# Patient Record
Sex: MALE | Race: BLACK OR AFRICAN AMERICAN | NOT HISPANIC OR LATINO | Employment: UNEMPLOYED | URBAN - METROPOLITAN AREA
[De-identification: names, ages, dates, MRNs, and addresses within clinical notes are randomized per-mention and may not be internally consistent; named-entity substitution may affect disease eponyms.]

---

## 2023-02-28 ENCOUNTER — HOSPITAL ENCOUNTER (OUTPATIENT)
Dept: DATA CONVERSION | Facility: HOSPITAL | Age: 33
End: 2023-02-28

## 2023-03-30 ENCOUNTER — HOSPITAL ENCOUNTER (OUTPATIENT)
Dept: DATA CONVERSION | Facility: HOSPITAL | Age: 33
End: 2023-03-30

## 2023-12-15 ENCOUNTER — HOSPITAL ENCOUNTER (EMERGENCY)
Facility: HOSPITAL | Age: 33
Discharge: HOME | End: 2023-12-15
Payer: MEDICAID

## 2023-12-15 ENCOUNTER — ANCILLARY PROCEDURE (OUTPATIENT)
Dept: EMERGENCY MEDICINE | Facility: HOSPITAL | Age: 33
End: 2023-12-15
Payer: MEDICAID

## 2023-12-15 ENCOUNTER — HOSPITAL ENCOUNTER (EMERGENCY)
Facility: HOSPITAL | Age: 33
Discharge: HOME | End: 2023-12-15
Attending: EMERGENCY MEDICINE
Payer: MEDICAID

## 2023-12-15 VITALS
RESPIRATION RATE: 18 BRPM | HEIGHT: 66 IN | SYSTOLIC BLOOD PRESSURE: 107 MMHG | TEMPERATURE: 98.2 F | DIASTOLIC BLOOD PRESSURE: 57 MMHG | HEART RATE: 61 BPM | OXYGEN SATURATION: 100 % | BODY MASS INDEX: 24.11 KG/M2 | WEIGHT: 150 LBS

## 2023-12-15 VITALS
OXYGEN SATURATION: 100 % | SYSTOLIC BLOOD PRESSURE: 114 MMHG | HEART RATE: 72 BPM | TEMPERATURE: 97.9 F | RESPIRATION RATE: 18 BRPM | DIASTOLIC BLOOD PRESSURE: 71 MMHG

## 2023-12-15 DIAGNOSIS — R07.9 CHEST PAIN, UNSPECIFIED TYPE: Primary | ICD-10-CM

## 2023-12-15 LAB
ALBUMIN SERPL BCP-MCNC: 3.6 G/DL (ref 3.4–5)
ALP SERPL-CCNC: 52 U/L (ref 33–120)
ALT SERPL W P-5'-P-CCNC: 12 U/L (ref 10–52)
ANION GAP SERPL CALC-SCNC: 9 MMOL/L (ref 10–20)
AST SERPL W P-5'-P-CCNC: 17 U/L (ref 9–39)
BASOPHILS # BLD AUTO: 0.03 X10*3/UL (ref 0–0.1)
BASOPHILS NFR BLD AUTO: 0.4 %
BILIRUB SERPL-MCNC: 0.2 MG/DL (ref 0–1.2)
BUN SERPL-MCNC: 9 MG/DL (ref 6–23)
CALCIUM SERPL-MCNC: 8.8 MG/DL (ref 8.6–10.6)
CARDIAC TROPONIN I PNL SERPL HS: <3 NG/L (ref 0–53)
CHLORIDE SERPL-SCNC: 107 MMOL/L (ref 98–107)
CO2 SERPL-SCNC: 30 MMOL/L (ref 21–32)
CREAT SERPL-MCNC: 1.1 MG/DL (ref 0.5–1.3)
EOSINOPHIL # BLD AUTO: 0.14 X10*3/UL (ref 0–0.7)
EOSINOPHIL NFR BLD AUTO: 1.8 %
ERYTHROCYTE [DISTWIDTH] IN BLOOD BY AUTOMATED COUNT: 12.8 % (ref 11.5–14.5)
FLUAV RNA RESP QL NAA+PROBE: NOT DETECTED
FLUBV RNA RESP QL NAA+PROBE: NOT DETECTED
GFR SERPL CREATININE-BSD FRML MDRD: >90 ML/MIN/1.73M*2
GLUCOSE SERPL-MCNC: 84 MG/DL (ref 74–99)
HCT VFR BLD AUTO: 39.1 % (ref 41–52)
HGB BLD-MCNC: 13.3 G/DL (ref 13.5–17.5)
IMM GRANULOCYTES # BLD AUTO: 0.02 X10*3/UL (ref 0–0.7)
IMM GRANULOCYTES NFR BLD AUTO: 0.3 % (ref 0–0.9)
LIPASE SERPL-CCNC: 114 U/L (ref 9–82)
LYMPHOCYTES # BLD AUTO: 3.87 X10*3/UL (ref 1.2–4.8)
LYMPHOCYTES NFR BLD AUTO: 49.2 %
MAGNESIUM SERPL-MCNC: 2.02 MG/DL (ref 1.6–2.4)
MCH RBC QN AUTO: 31.9 PG (ref 26–34)
MCHC RBC AUTO-ENTMCNC: 34 G/DL (ref 32–36)
MCV RBC AUTO: 94 FL (ref 80–100)
MONOCYTES # BLD AUTO: 0.5 X10*3/UL (ref 0.1–1)
MONOCYTES NFR BLD AUTO: 6.4 %
NEUTROPHILS # BLD AUTO: 3.3 X10*3/UL (ref 1.2–7.7)
NEUTROPHILS NFR BLD AUTO: 41.9 %
NRBC BLD-RTO: 0 /100 WBCS (ref 0–0)
PLATELET # BLD AUTO: 278 X10*3/UL (ref 150–450)
POTASSIUM SERPL-SCNC: 4.4 MMOL/L (ref 3.5–5.3)
PROT SERPL-MCNC: 5.7 G/DL (ref 6.4–8.2)
RBC # BLD AUTO: 4.17 X10*6/UL (ref 4.5–5.9)
SARS-COV-2 RNA RESP QL NAA+PROBE: NOT DETECTED
SODIUM SERPL-SCNC: 142 MMOL/L (ref 136–145)
WBC # BLD AUTO: 7.9 X10*3/UL (ref 4.4–11.3)

## 2023-12-15 PROCEDURE — 87636 SARSCOV2 & INF A&B AMP PRB: CPT | Performed by: EMERGENCY MEDICINE

## 2023-12-15 PROCEDURE — 83735 ASSAY OF MAGNESIUM: CPT | Performed by: EMERGENCY MEDICINE

## 2023-12-15 PROCEDURE — 99284 EMERGENCY DEPT VISIT MOD MDM: CPT

## 2023-12-15 PROCEDURE — 85025 COMPLETE CBC W/AUTO DIFF WBC: CPT | Performed by: EMERGENCY MEDICINE

## 2023-12-15 PROCEDURE — 84484 ASSAY OF TROPONIN QUANT: CPT | Performed by: EMERGENCY MEDICINE

## 2023-12-15 PROCEDURE — 36415 COLL VENOUS BLD VENIPUNCTURE: CPT | Performed by: EMERGENCY MEDICINE

## 2023-12-15 PROCEDURE — 99281 EMR DPT VST MAYX REQ PHY/QHP: CPT

## 2023-12-15 PROCEDURE — 84075 ASSAY ALKALINE PHOSPHATASE: CPT | Performed by: EMERGENCY MEDICINE

## 2023-12-15 PROCEDURE — 83690 ASSAY OF LIPASE: CPT | Performed by: EMERGENCY MEDICINE

## 2023-12-15 PROCEDURE — 93005 ELECTROCARDIOGRAM TRACING: CPT

## 2023-12-15 PROCEDURE — 99283 EMERGENCY DEPT VISIT LOW MDM: CPT | Performed by: EMERGENCY MEDICINE

## 2023-12-15 RX ORDER — LIDOCAINE 560 MG/1
1 PATCH PERCUTANEOUS; TOPICAL; TRANSDERMAL DAILY
Status: DISCONTINUED | OUTPATIENT
Start: 2023-12-15 | End: 2023-12-15 | Stop reason: HOSPADM

## 2023-12-15 RX ORDER — LIDOCAINE 50 MG/G
1 PATCH TOPICAL DAILY
Qty: 10 PATCH | Refills: 0 | OUTPATIENT
Start: 2023-12-15 | End: 2024-02-13

## 2023-12-15 RX ORDER — ACETAMINOPHEN 325 MG/1
650 TABLET ORAL ONCE
Status: COMPLETED | OUTPATIENT
Start: 2023-12-15 | End: 2023-12-15

## 2023-12-15 RX ORDER — ACETAMINOPHEN 500 MG
1000 TABLET ORAL EVERY 6 HOURS PRN
Qty: 30 TABLET | Refills: 0 | OUTPATIENT
Start: 2023-12-15 | End: 2023-12-18

## 2023-12-15 RX ADMIN — ACETAMINOPHEN 650 MG: 325 TABLET ORAL at 05:47

## 2023-12-15 ASSESSMENT — COLUMBIA-SUICIDE SEVERITY RATING SCALE - C-SSRS
1. IN THE PAST MONTH, HAVE YOU WISHED YOU WERE DEAD OR WISHED YOU COULD GO TO SLEEP AND NOT WAKE UP?: NO
2. HAVE YOU ACTUALLY HAD ANY THOUGHTS OF KILLING YOURSELF?: NO
6. HAVE YOU EVER DONE ANYTHING, STARTED TO DO ANYTHING, OR PREPARED TO DO ANYTHING TO END YOUR LIFE?: NO

## 2023-12-15 NOTE — DISCHARGE INSTRUCTIONS
You presented for evaluation of chest pain.  You had labs including an EKG and cardiac marker called troponin which were unremarkable.  You were asymptomatic at the time of our evaluation however we treated you with a lidocaine patch and Tylenol.  Suspect your pain is musculoskeletal however we provided you with contact information for cardiology and primary care provider.  Should you have worsening symptoms please return to the emergency department.

## 2023-12-15 NOTE — ED TRIAGE NOTES
Pt to ED c/o chest pain four hours ago. Pt describes it as a left sided, non radiating, sharp, intermittent pain and rates it 7/10. Pt denies any nausea, vomiting, shortness of breath.

## 2023-12-15 NOTE — Clinical Note
Misael Rhodes was seen and treated in our emergency department on 12/15/2023.  He may return to work on 12/16/2023.       If you have any questions or concerns, please don't hesitate to call.      Didi Sam, DO

## 2023-12-15 NOTE — ED PROVIDER NOTES
HPI   Chief Complaint   Patient presents with    Chest Pain       33-year-old male without significant past medical history presenting to the ED with chest pain x 4 hours.  Patient describes it as left-sided, nonradiating, sharp and intermittent.  Pain is not associated with movement or exertion.  He denies any shortness of breath, cough, fever.  No history of DVT/PE, no lower extremity swelling.  Denies nausea, vomiting, abdominal pain.  States that he has had this intermittent pain on and off for the last few months.                          No data recorded                Patient History   No past medical history on file.  No past surgical history on file.  No family history on file.  Social History     Tobacco Use    Smoking status: Not on file    Smokeless tobacco: Not on file   Substance Use Topics    Alcohol use: Not on file    Drug use: Not on file       Physical Exam   ED Triage Vitals   Temp Pulse Resp BP   -- -- -- --      SpO2 Temp src Heart Rate Source Patient Position   -- -- -- --      BP Location FiO2 (%)     -- --       Physical Exam  Vitals and nursing note reviewed.   Constitutional:       General: He is not in acute distress.     Appearance: He is well-developed. He is not ill-appearing or toxic-appearing.   HENT:      Head: Normocephalic and atraumatic.   Eyes:      Extraocular Movements: Extraocular movements intact.      Conjunctiva/sclera: Conjunctivae normal.      Pupils: Pupils are equal, round, and reactive to light.   Neck:      Vascular: No JVD.   Cardiovascular:      Rate and Rhythm: Normal rate and regular rhythm.      Heart sounds: Normal heart sounds. No murmur heard.  Pulmonary:      Effort: Pulmonary effort is normal. No respiratory distress.      Breath sounds: Normal breath sounds. No decreased breath sounds, wheezing or rhonchi.   Abdominal:      General: There is no abdominal bruit.      Palpations: Abdomen is soft.      Tenderness: There is no abdominal tenderness. There is no  guarding or rebound.   Musculoskeletal:         General: No swelling. Normal range of motion.      Cervical back: Normal range of motion and neck supple.      Right lower leg: No tenderness. No edema.      Left lower leg: No tenderness. No edema.   Skin:     General: Skin is warm and dry.      Capillary Refill: Capillary refill takes less than 2 seconds.   Neurological:      General: No focal deficit present.      Mental Status: He is alert and oriented to person, place, and time.   Psychiatric:         Mood and Affect: Mood normal.         ED Course & MDM   ED Course as of 12/15/23 2237   Fri Dec 15, 2023   0300 ECG 12 lead  Sinus bradycardia with rate 58, normal axis.  No acute ST segment elevation or depression.  , , QTc 390. [KR]      ED Course User Index  [KR] Didi Sam DO         Diagnoses as of 12/15/23 2237   Chest pain, unspecified type       Medical Decision Making  Misael Rhodes is a 33 y.o. male who presents for atypical chest pain. Differential diagnosis includes costochondritis, MSK strain. Low suspicion for ACS (HEAR score 0), acute PE (low risk Well's and negative PERC), pericarditis, myocarditis, thoracic aortic dissection, pneumothorax, pneumonia or other acute infectious etiology.  Patient had recent imaging performed at outside hospital including a chest x-ray 12/12 showing no acute pulmonary findings.  No indication for troponin at this time given low risk HEAR score and atypical presentation. EKG was obtained, nonischemic.  Patient asymptomatic at the time of my evaluation.  Treated with Tylenol dose.  Given follow-up for primary care and cardiology. Patient was discharged home in stable condition. Patient was advised to return if symptoms worsen or don't improve. Patient was advised to follow up with their PCP as needed.           Procedure  Procedures     Didi Sam DO  Resident  12/15/23 7401

## 2023-12-16 LAB
ATRIAL RATE: 58 BPM
P AXIS: -2 DEGREES
P OFFSET: 215 MS
P ONSET: 161 MS
PR INTERVAL: 122 MS
Q ONSET: 222 MS
QRS COUNT: 9 BEATS
QRS DURATION: 102 MS
QT INTERVAL: 398 MS
QTC CALCULATION(BAZETT): 390 MS
QTC FREDERICIA: 393 MS
R AXIS: 59 DEGREES
T AXIS: 56 DEGREES
T OFFSET: 421 MS
VENTRICULAR RATE: 58 BPM

## 2023-12-18 ENCOUNTER — HOSPITAL ENCOUNTER (EMERGENCY)
Facility: HOSPITAL | Age: 33
Discharge: HOME | End: 2023-12-18
Attending: EMERGENCY MEDICINE
Payer: MEDICAID

## 2023-12-18 ENCOUNTER — HOSPITAL ENCOUNTER (EMERGENCY)
Facility: HOSPITAL | Age: 33
Discharge: LEFT BEFORE DISCHARGE | End: 2023-12-18
Payer: MEDICAID

## 2023-12-18 VITALS
HEART RATE: 91 BPM | RESPIRATION RATE: 20 BRPM | TEMPERATURE: 98.8 F | DIASTOLIC BLOOD PRESSURE: 61 MMHG | OXYGEN SATURATION: 97 % | SYSTOLIC BLOOD PRESSURE: 95 MMHG

## 2023-12-18 DIAGNOSIS — G44.209 TENSION-TYPE HEADACHE, NOT INTRACTABLE, UNSPECIFIED CHRONICITY PATTERN: Primary | ICD-10-CM

## 2023-12-18 PROCEDURE — 99284 EMERGENCY DEPT VISIT MOD MDM: CPT | Performed by: EMERGENCY MEDICINE

## 2023-12-18 PROCEDURE — 2500000004 HC RX 250 GENERAL PHARMACY W/ HCPCS (ALT 636 FOR OP/ED): Mod: SE

## 2023-12-18 PROCEDURE — 2500000001 HC RX 250 WO HCPCS SELF ADMINISTERED DRUGS (ALT 637 FOR MEDICARE OP): Mod: SE

## 2023-12-18 PROCEDURE — 96372 THER/PROPH/DIAG INJ SC/IM: CPT

## 2023-12-18 PROCEDURE — 99283 EMERGENCY DEPT VISIT LOW MDM: CPT | Mod: 25

## 2023-12-18 PROCEDURE — 4500999001 HC ED NO CHARGE

## 2023-12-18 RX ORDER — IBUPROFEN 600 MG/1
600 TABLET ORAL EVERY 6 HOURS PRN
Qty: 28 TABLET | Refills: 0 | Status: SHIPPED | OUTPATIENT
Start: 2023-12-18 | End: 2023-12-25

## 2023-12-18 RX ORDER — KETOROLAC TROMETHAMINE 30 MG/ML
30 INJECTION, SOLUTION INTRAMUSCULAR; INTRAVENOUS ONCE
Status: COMPLETED | OUTPATIENT
Start: 2023-12-18 | End: 2023-12-18

## 2023-12-18 RX ORDER — ACETAMINOPHEN 500 MG
1000 TABLET ORAL EVERY 6 HOURS PRN
Qty: 30 TABLET | Refills: 0 | Status: SHIPPED | OUTPATIENT
Start: 2023-12-18 | End: 2023-12-28

## 2023-12-18 RX ORDER — PROCHLORPERAZINE MALEATE 10 MG
10 TABLET ORAL ONCE
Status: COMPLETED | OUTPATIENT
Start: 2023-12-18 | End: 2023-12-18

## 2023-12-18 RX ORDER — ACETAMINOPHEN 325 MG/1
975 TABLET ORAL ONCE
Status: COMPLETED | OUTPATIENT
Start: 2023-12-18 | End: 2023-12-18

## 2023-12-18 RX ADMIN — ACETAMINOPHEN 975 MG: 325 TABLET ORAL at 02:51

## 2023-12-18 RX ADMIN — PROCHLORPERAZINE MALEATE 10 MG: 10 TABLET ORAL at 02:51

## 2023-12-18 RX ADMIN — KETOROLAC TROMETHAMINE 30 MG: 30 INJECTION, SOLUTION INTRAMUSCULAR; INTRAVENOUS at 02:51

## 2023-12-18 ASSESSMENT — COLUMBIA-SUICIDE SEVERITY RATING SCALE - C-SSRS
6. HAVE YOU EVER DONE ANYTHING, STARTED TO DO ANYTHING, OR PREPARED TO DO ANYTHING TO END YOUR LIFE?: YES
6. HAVE YOU EVER DONE ANYTHING, STARTED TO DO ANYTHING, OR PREPARED TO DO ANYTHING TO END YOUR LIFE?: NO
2. HAVE YOU ACTUALLY HAD ANY THOUGHTS OF KILLING YOURSELF?: NO
1. IN THE PAST MONTH, HAVE YOU WISHED YOU WERE DEAD OR WISHED YOU COULD GO TO SLEEP AND NOT WAKE UP?: NO

## 2023-12-18 NOTE — DISCHARGE INSTRUCTIONS
You presented with a headache and were treated with medications with improvement in symptoms.  Please return if you experience any worsening headache, nausea, vomiting, dizziness or progress of symptoms that do not resolve.

## 2023-12-18 NOTE — ED PROVIDER NOTES
HPI   Chief Complaint   Patient presents with    Headache     Onset approximately 5 hours ago       33-year-old male presenting to the ED with headache x 5 hours.  Patient describes pain as located all over, endorses neck tightness.  States that he was walking and the pain was gradual in onset and has just progressed.  He has not taken anything for symptoms.  He denies any nausea, vomiting, dizziness, vision changes, light sensitivity, sound sensitivity.  He denies any recent traumas or falls.  States that he does not routinely get headaches but has had similar episodes in the past.  Denies fever, chills, sick contacts.  Denies numbness or weakness.                          No data recorded                Patient History   No past medical history on file.  No past surgical history on file.  No family history on file.  Social History     Tobacco Use    Smoking status: Not on file    Smokeless tobacco: Not on file   Substance Use Topics    Alcohol use: Not on file    Drug use: Not on file       Physical Exam   ED Triage Vitals [12/18/23 0051]   Temp Heart Rate Resp BP   37.1 °C (98.8 °F) 91 20 95/61      SpO2 Temp Source Heart Rate Source Patient Position   97 % Temporal -- --      BP Location FiO2 (%)     -- --       Physical Exam  Vitals and nursing note reviewed.   Constitutional:       General: He is not in acute distress.     Appearance: He is well-developed. He is not ill-appearing.   HENT:      Head: Normocephalic and atraumatic.   Eyes:      General: No visual field deficit.     Extraocular Movements: Extraocular movements intact.      Conjunctiva/sclera: Conjunctivae normal.      Pupils: Pupils are equal, round, and reactive to light.   Cardiovascular:      Rate and Rhythm: Normal rate and regular rhythm.      Heart sounds: Normal heart sounds. No murmur heard.  Pulmonary:      Effort: Pulmonary effort is normal. No respiratory distress.      Breath sounds: Normal breath sounds.   Abdominal:      Palpations:  Abdomen is soft.      Tenderness: There is no abdominal tenderness.   Musculoskeletal:         General: No swelling. Normal range of motion.      Cervical back: Normal range of motion and neck supple. No rigidity.      Comments: Paraspinal cervical tenderness   Skin:     General: Skin is warm and dry.      Capillary Refill: Capillary refill takes less than 2 seconds.   Neurological:      Mental Status: He is alert and oriented to person, place, and time. Mental status is at baseline.      GCS: GCS eye subscore is 4. GCS verbal subscore is 5. GCS motor subscore is 6.      Cranial Nerves: No cranial nerve deficit or dysarthria.      Sensory: No sensory deficit.      Motor: No weakness.      Gait: Gait normal.   Psychiatric:         Mood and Affect: Mood normal.         ED Course & MDM   Diagnoses as of 12/18/23 0503   Tension-type headache, not intractable, unspecified chronicity pattern       Medical Decision Making  33-year-old male presenting with tension-like headache.  Afebrile, hemodynamically stable and in no acute distress.  Patient calm, alert and oriented and without focal neurologic deficits.  Pain was not sudden onset, no thunderclap intensity or neurologic deficits, low suspicion for SAH.  No recent trauma or fall to suggest intracranial bleed.  Patient ambulatory with steady gait.  Patient has reproducible cervical neck tenderness in the paraspinal musculature with reproduction of symptoms suggestive of tension headache.  No aura, nausea, vomiting, photophobia or phonophobia so lower suspicion for migraine.  Treated with Tylenol, Toradol and Compazine and on repeat evaluation was noting improvement in symptoms.  Patient provided with strict return precaution including progressive symptoms, vision changes, nausea, vomiting.  Discharged in stable condition.        Procedure  Procedures     Didi Sam DO  Resident  12/18/23 6371

## 2023-12-21 ENCOUNTER — HOSPITAL ENCOUNTER (EMERGENCY)
Facility: HOSPITAL | Age: 33
Discharge: HOME | End: 2023-12-21
Attending: EMERGENCY MEDICINE
Payer: MEDICAID

## 2023-12-21 VITALS
WEIGHT: 140 LBS | HEART RATE: 65 BPM | DIASTOLIC BLOOD PRESSURE: 69 MMHG | OXYGEN SATURATION: 100 % | RESPIRATION RATE: 17 BRPM | BODY MASS INDEX: 22.5 KG/M2 | SYSTOLIC BLOOD PRESSURE: 122 MMHG | TEMPERATURE: 98 F | HEIGHT: 66 IN

## 2023-12-21 DIAGNOSIS — Z59.819 HOUSING INSECURITY: Primary | ICD-10-CM

## 2023-12-21 PROCEDURE — 99283 EMERGENCY DEPT VISIT LOW MDM: CPT | Performed by: EMERGENCY MEDICINE

## 2023-12-21 PROCEDURE — 99284 EMERGENCY DEPT VISIT MOD MDM: CPT | Mod: 25

## 2023-12-21 SDOH — HEALTH STABILITY: MENTAL HEALTH: HAVE YOU ACTUALLY HAD ANY THOUGHTS OF KILLING YOURSELF?: NO

## 2023-12-21 SDOH — ECONOMIC STABILITY - HOUSING INSECURITY: HOUSING INSTABILITY UNSPECIFIED: Z59.819

## 2023-12-21 SDOH — HEALTH STABILITY: MENTAL HEALTH: SUICIDE ASSESSMENT: ADULT (C-SSRS)

## 2023-12-21 SDOH — HEALTH STABILITY: MENTAL HEALTH: HAVE YOU EVER DONE ANYTHING, STARTED TO DO ANYTHING, OR PREPARED TO DO ANYTHING TO END YOUR LIFE?: NO

## 2023-12-21 SDOH — HEALTH STABILITY: MENTAL HEALTH: HAVE YOU WISHED YOU WERE DEAD OR WISHED YOU COULD GO TO SLEEP AND NOT WAKE UP?: NO

## 2023-12-21 ASSESSMENT — LIFESTYLE VARIABLES
REASON UNABLE TO ASSESS: YES
HAVE YOU EVER FELT YOU SHOULD CUT DOWN ON YOUR DRINKING: NO
HAVE PEOPLE ANNOYED YOU BY CRITICIZING YOUR DRINKING: NO
EVER HAD A DRINK FIRST THING IN THE MORNING TO STEADY YOUR NERVES TO GET RID OF A HANGOVER: NO
EVER FELT BAD OR GUILTY ABOUT YOUR DRINKING: NO

## 2023-12-21 ASSESSMENT — COLUMBIA-SUICIDE SEVERITY RATING SCALE - C-SSRS
6. HAVE YOU EVER DONE ANYTHING, STARTED TO DO ANYTHING, OR PREPARED TO DO ANYTHING TO END YOUR LIFE?: NO
2. HAVE YOU ACTUALLY HAD ANY THOUGHTS OF KILLING YOURSELF?: NO
1. IN THE PAST MONTH, HAVE YOU WISHED YOU WERE DEAD OR WISHED YOU COULD GO TO SLEEP AND NOT WAKE UP?: NO

## 2023-12-21 ASSESSMENT — PAIN - FUNCTIONAL ASSESSMENT: PAIN_FUNCTIONAL_ASSESSMENT: 0-10

## 2023-12-21 ASSESSMENT — PAIN SCALES - GENERAL
PAINLEVEL_OUTOF10: 0 - NO PAIN
PAINLEVEL_OUTOF10: 0 - NO PAIN

## 2023-12-21 NOTE — ED TRIAGE NOTES
Enters ED c/o R-leg pain that began 1 hour prior. Pt also having auditory hallucinations and talking to oneself. Hostile during triage.

## 2023-12-21 NOTE — ED PROVIDER NOTES
CC: Leg Pain     HPI:  Patient is a 33-year-old male with history of schizophrenia presenting to the ED requesting housing resources.  Patient states he is living on the streets and has nowhere to sleep.  Patient does endorse a left thigh cramp which started when he was walking into the ED but has since resolved.  States he has been taking his psychiatric medications and denies any SI/HI, auditory or visual hallucinations.  Denies any trauma or other medical complaints.      Limitations to History: None    Records Reviewed:  Recent available ED and inpatient notes reviewed in EMR.    PMHx/PSHx:  Per HPI.   - has no past medical history on file.  - has no past surgical history on file.    Medications:  Reviewed in EMR. See EMR for complete list of medications and doses.    Allergies:  Patient has no known allergies.    Social History:  - Tobacco:  has no history on file for tobacco use.   - Alcohol:  has no history on file for alcohol use.   - Illicit Drugs:  has no history on file for drug use.     ROS:  Per HPI.     ???????????????????????????????????????????????????????????????  Triage Vitals:  T 36.4 °C (97.5 °F)  HR 66  /68  RR 19  O2 100 %      PHYSICAL EXAM:   VS: As documented in the triage note and EMR flowsheet from this visit were reviewed.  Gen: Well developed.  Appears comfortable.  Eyes: pupils equally round, Clear scerla.  HENT: NC/AT, Mucosal membranes moist.   Neck: Supple, tracheal midline  Resp: Non-labored breathing on RA, no stridor  CV: regular rate, extremities well perfused  Abd: Soft, non-distended, non-tender  Skin: WWP. No systemic rashes or lesions.  MSK: normal muscle bulk, no obvious joint swelling in extremities  Neuro:  AAOx3, speech fluent, MAEx4, no facial droop  Psych: Appropriate mood and affect, calm cooperative  ???????????????????????????????????????????????????????????????    ED Labs/Imaging:   Labs Reviewed - No data to display  No orders to display         ED Course  & MDM            Medical Decision Making  This is a 33-year-old male with history of schizophrenia presenting to the ED requesting housing assistance.  Patient arrives hemodynamically stable and not in acute distress.  Patient did endorse leg pain in triage but states it is a muscle cramp he gets every once in a while and on my exam is likely musculoskeletal in nature, Wells negative do not suspect trauma or DVT.  Patient otherwise is calm cooperative with appropriate mood and affect.  Patient has no SI/HI, or auditory/visual hallucinations to warrant inpatient psychiatric placement.  Patient will be discharged in the morning after social work is able to see him to discuss housing options.      Social Determinants Limiting Care:  Housing insecurity and Mental health issues    Disposition:  Discharge, pending social work assistance.     Patient seen and discussed with attending physician.    Alona Llanos MD PGY3  Emergency Medicine      Procedures ? [employer] last updated 12/21/2023 3:45 AM        Alona Llanos MD  Resident  12/21/23 3711

## 2023-12-21 NOTE — ED NOTES
Discharge paperwork gone over with pt. Education provided and prescriptions (if applicable) were given to pt. Pt IV removed and there is no bleeding at the site of removal. Pt was ambulatory out of ED independently. Pt has no further questions or concerns at this time. Treatment complete.        Blayne Eid RN  12/21/23 6845

## 2023-12-21 NOTE — PROGRESS NOTES
"Misael Rhodes is a 33 y.o. male presenting overnight to ED for lack of shelter in cold weather. SW met with patient at bedside to discuss needs. Patient reports, \"I don't get what I want from anyone, they give me what they need\". SW and patient discussed his situation; patient describes recently employment at [employer], some alcohol use, and waiting for his SSDI. Patient reports no recent criminal history or  status to assist with housing.   Patient states that he Is \"willing to work, I need a better situation\".  Patient reports homelessness and no psych meds in \"2-3 years\". SW asked patient about community SW/psych connection, recent shelter history, and about his address. Patient states his mother lives on [address] (pt's listed address); \"don't know what's she's up to\". Patient agrees with SW that he does not appear to have lived on the streets for years; patient agreeable to [facility] for stabalization.  SW to provide patient with information for [facility] (seasonal shelter), [employer] [facility] [place]'s newer program, and [facility].  SW provide patient instructions for CCD; pt to call [facility] for phone screen.  Patient is medically ready for discharge. ANNIE will schedule Uber to: [facility] [address] [place] [address].         12/21/23 0954   ED Saved Days   Number of Saved Days 3   Reason for Saved Days Inappropriate Admission Avoided  (Not appropriate psychiatrically/medically)   Level Acute   Attributed to: User (Self)       "

## 2024-01-02 ENCOUNTER — APPOINTMENT (OUTPATIENT)
Dept: PRIMARY CARE | Facility: CLINIC | Age: 34
End: 2024-01-02
Payer: MEDICAID

## 2024-01-05 ENCOUNTER — HOSPITAL ENCOUNTER (EMERGENCY)
Facility: HOSPITAL | Age: 34
Discharge: HOME | End: 2024-01-05
Payer: MEDICAID

## 2024-01-05 VITALS
HEART RATE: 87 BPM | RESPIRATION RATE: 16 BRPM | OXYGEN SATURATION: 99 % | SYSTOLIC BLOOD PRESSURE: 97 MMHG | DIASTOLIC BLOOD PRESSURE: 63 MMHG | TEMPERATURE: 95.9 F

## 2024-01-05 DIAGNOSIS — Z76.0 MEDICATION REFILL: Primary | ICD-10-CM

## 2024-01-05 PROCEDURE — 99283 EMERGENCY DEPT VISIT LOW MDM: CPT

## 2024-01-05 PROCEDURE — 2500000004 HC RX 250 GENERAL PHARMACY W/ HCPCS (ALT 636 FOR OP/ED): Mod: SE | Performed by: PHYSICIAN ASSISTANT

## 2024-01-05 PROCEDURE — 99283 EMERGENCY DEPT VISIT LOW MDM: CPT | Performed by: PHYSICIAN ASSISTANT

## 2024-01-05 RX ORDER — RISPERIDONE 1 MG/1
4 TABLET ORAL ONCE
Status: COMPLETED | OUTPATIENT
Start: 2024-01-05 | End: 2024-01-05

## 2024-01-05 RX ORDER — RISPERIDONE 4 MG/1
4 TABLET ORAL DAILY
Qty: 30 TABLET | Refills: 0 | Status: SHIPPED | OUTPATIENT
Start: 2024-01-05 | End: 2024-02-04

## 2024-01-05 RX ADMIN — RISPERIDONE 4 MG: 1 TABLET ORAL at 14:55

## 2024-01-05 NOTE — ED TRIAGE NOTES
Pt to ED for risperdal refill, unable to get appt with provider, denies physical complaints, otherwise well appearing

## 2024-01-05 NOTE — ED PROVIDER NOTES
This is a 33-year-old male with past medical history of schizophrenia who presents to the ED requesting a dose of his Risperdal as well as a refill on his prescription.  He states that he ran out of his prescription approximately 2 days ago.  He states that he has an appointment scheduled with his psychiatrist in early February.  He states he takes 4 mg daily.  He denies any SI, HI, visual or auditory hallucinations.  He states overall he is feeling well and denies any other symptoms or complaints other than wanting his Risperdal.      History provided by:  Patient  History limited by:  Psychiatric disorder   used: No             Visit Vitals  BP 97/63   Pulse 87   Temp 35.5 °C (95.9 °F)   Resp 16   SpO2 99%   Smoking Status Unknown          Physical Exam     Physical exam:   General: Vitals noted, no distress. Afebrile.   EENT:  Hearing grossly intact. Normal phonation. MMM. Airway patient. PERRL. EOMI.   Neck: No midline tenderness or paraspinal tenderness. FROM.   Cardiac: Regular, rate, rhythm. Normal S1 and S2.  No murmurs, gallops, rubs.   Pulmonary: Good air exchange. Lungs clear bilaterally. No wheezes, rhonchi, rales. No accessory muscle use.   Extremities: No peripheral edema.  Full range of motion. Moves all extremities freely. No tenderness throughout extremities.   Skin: No rash. Warm and Dry.   Neuro: No focal neurologic deficits. CN 2-12 grossly intact. Sensation equal bilaterally. No weakness.         Labs Reviewed - No data to display    No orders to display         ED Course & MDM     Medical Decision Making  This is a 33-year-old male with past medical history of schizophrenia who presents to the ED requesting a med refill for his Risperdal 4 mg daily.  Vitals did show he was mildly hypotensive at 97/63, however he denied any lightheadedness or dizziness.  On evaluation he was overall well-appearing.  He denies SI, HI, visual auditory hallucinations.  Patient did not appear to  be internally stimulated.  He was ordered 1 dose of his Risperdal here in the ED per his request and was written a prescription for this so he can get to his psychiatry department in early February.  He was given signs symptoms that he should return to the ED with.  He was agreeable to this plan.  He was discharged from emergency department in stable condition.    Risk  Prescription drug management.         Diagnoses as of 01/05/24 1450   Medication refill       Procedures    ROM Chaudhry, ARMINDA Garcia PA-C  01/05/24 1450

## 2024-01-07 ENCOUNTER — HOSPITAL ENCOUNTER (EMERGENCY)
Facility: HOSPITAL | Age: 34
Discharge: HOME | End: 2024-01-07
Attending: EMERGENCY MEDICINE
Payer: MEDICAID

## 2024-01-07 VITALS
HEIGHT: 66 IN | WEIGHT: 150 LBS | RESPIRATION RATE: 16 BRPM | BODY MASS INDEX: 24.11 KG/M2 | HEART RATE: 66 BPM | DIASTOLIC BLOOD PRESSURE: 72 MMHG | OXYGEN SATURATION: 97 % | TEMPERATURE: 98.6 F | SYSTOLIC BLOOD PRESSURE: 105 MMHG

## 2024-01-07 DIAGNOSIS — Z76.0 MEDICATION REFILL: Primary | ICD-10-CM

## 2024-01-07 PROCEDURE — 99283 EMERGENCY DEPT VISIT LOW MDM: CPT | Performed by: EMERGENCY MEDICINE

## 2024-01-07 PROCEDURE — 2500000004 HC RX 250 GENERAL PHARMACY W/ HCPCS (ALT 636 FOR OP/ED): Mod: SE | Performed by: STUDENT IN AN ORGANIZED HEALTH CARE EDUCATION/TRAINING PROGRAM

## 2024-01-07 RX ORDER — RISPERIDONE 1 MG/1
4 TABLET ORAL ONCE
Status: COMPLETED | OUTPATIENT
Start: 2024-01-07 | End: 2024-01-07

## 2024-01-07 RX ADMIN — RISPERIDONE 4 MG: 1 TABLET ORAL at 01:55

## 2024-01-07 ASSESSMENT — LIFESTYLE VARIABLES
EVER HAD A DRINK FIRST THING IN THE MORNING TO STEADY YOUR NERVES TO GET RID OF A HANGOVER: NO
HAVE YOU EVER FELT YOU SHOULD CUT DOWN ON YOUR DRINKING: NO
REASON UNABLE TO ASSESS: NO
EVER FELT BAD OR GUILTY ABOUT YOUR DRINKING: NO
HAVE PEOPLE ANNOYED YOU BY CRITICIZING YOUR DRINKING: NO

## 2024-01-07 ASSESSMENT — COLUMBIA-SUICIDE SEVERITY RATING SCALE - C-SSRS
2. HAVE YOU ACTUALLY HAD ANY THOUGHTS OF KILLING YOURSELF?: NO
6. HAVE YOU EVER DONE ANYTHING, STARTED TO DO ANYTHING, OR PREPARED TO DO ANYTHING TO END YOUR LIFE?: NO
1. IN THE PAST MONTH, HAVE YOU WISHED YOU WERE DEAD OR WISHED YOU COULD GO TO SLEEP AND NOT WAKE UP?: NO

## 2024-01-07 ASSESSMENT — PAIN SCALES - GENERAL: PAINLEVEL_OUTOF10: 0 - NO PAIN

## 2024-01-07 ASSESSMENT — PAIN - FUNCTIONAL ASSESSMENT: PAIN_FUNCTIONAL_ASSESSMENT: 0-10

## 2024-01-07 NOTE — DISCHARGE INSTRUCTIONS
Please return to the emergency department for any worsening or persistent symptoms including concern for decompensation of your schizophrenia including hallucinations, thoughts of wanting to harm yourself or others.  Please make sure that you follow-up with your psychiatry team.  You can refill your prescription at any pharmacy that is open, please bring the papers were given during your ED visit yesterday.

## 2024-01-07 NOTE — ED PROVIDER NOTES
HPI: 33-year-old male with past medical history of schizophrenia presents the emergency department with a request to have an additional dose of Risperdal.  Patient states he is on 4 mg of Risperdal daily, endorsing that he is due for his medications to come to him on Wednesday.  States that he was seen in the emergency department yesterday where he received a dose of Risperdal, per chart review he additionally received a prescription.  Patient states that he did not realize he could bring the prescription to the pharmacy tomorrow prior to his medication treatment on Wednesday.  He denies any concern for acute decompensation today.  Denies any HI, SI, AVH.  Additionally denies any chest pain, shortness of breath, fevers, chills.        ED Triage Vitals [01/07/24 0030]   Temp Heart Rate Resp BP   37 °C (98.6 °F) 74 18 110/66      SpO2 Temp Source Heart Rate Source Patient Position   97 % Temporal -- --      BP Location FiO2 (%)     -- --         Physical Examination  Vital signs reviewed in nursing triage note, on EMR flow sheets, and at bedside.  GEN: Well-developed, well nourished, in no apparent distress.   SKIN: Warm, dry, intact. No gross rashes or lesions.   EYES: Pupils equal, round. EOM grossly intact. Conjunctiva clear.   HENT: Normocephalic, atraumatic, mucous membranes moist.   NECK: Supple, trachea midline.   CV: Regular rate.   PULM: Breathing nonlabored on room air, speaks in full sentences.   GI: Abd soft, nontender, nondistended.   EXT: Symmetric muscle bulk, moves all equally.   NEURO: Alert, CN II-XII grossly intact, no gross focal deficits. Ambulates with normal gait.   PSYCH: Answers questions appropriately with congruent affect.  Denies SI, HI, AVH    MDM  33-year-old male with past medical history of schizophrenia presents the emergency department requesting dose of Risperdal.  Vital signs are stable, patient is nontoxic.  Patient was just seen in the emergency department yesterday, given a dose  of Risperdal at that time and sent home with a prescription of Risperdal until his next psychiatry appointment in February.  On presentation today, endorses that he did not realize he could bring the prescription physically to a pharmacy to have it filled.  I discussed this with him as well as gave him a dose of 4 mg of Risperdal here in the emergency department today.  He verbalized understanding, has no concerns for acute psychiatric decompensation.  He is discharged home in stable condition.    Diagnoses as of 01/07/24 0152   Medication refill          Disposition  - Discharged    Discussed with attending physician, Dr. Yaritza Crouch,   EM PGY3     Garrick Crouch DO  Resident  01/07/24 0155

## 2024-01-07 NOTE — ED TRIAGE NOTES
"Enters ED requesting \"Risperdal\" medication injection. States he has not taken medication x1 day and needs a dose. Denies SI/HI, auditory or visual hallucinations at this time.   "

## 2024-01-08 ENCOUNTER — HOSPITAL ENCOUNTER (EMERGENCY)
Facility: HOSPITAL | Age: 34
End: 2024-01-08
Payer: MEDICAID

## 2024-01-10 ENCOUNTER — HOSPITAL ENCOUNTER (EMERGENCY)
Facility: HOSPITAL | Age: 34
Discharge: HOME | End: 2024-01-10
Payer: MEDICAID

## 2024-01-10 VITALS
WEIGHT: 150 LBS | TEMPERATURE: 97.9 F | RESPIRATION RATE: 16 BRPM | DIASTOLIC BLOOD PRESSURE: 64 MMHG | SYSTOLIC BLOOD PRESSURE: 101 MMHG | OXYGEN SATURATION: 98 % | HEART RATE: 83 BPM | HEIGHT: 66 IN | BODY MASS INDEX: 24.11 KG/M2

## 2024-01-10 DIAGNOSIS — Z76.89 ENCOUNTER FOR MEDICATION ADMINISTRATION: Primary | ICD-10-CM

## 2024-01-10 PROCEDURE — 2500000004 HC RX 250 GENERAL PHARMACY W/ HCPCS (ALT 636 FOR OP/ED): Mod: SE | Performed by: PHYSICIAN ASSISTANT

## 2024-01-10 PROCEDURE — 99283 EMERGENCY DEPT VISIT LOW MDM: CPT

## 2024-01-10 PROCEDURE — 99283 EMERGENCY DEPT VISIT LOW MDM: CPT | Performed by: PHYSICIAN ASSISTANT

## 2024-01-10 RX ORDER — RISPERIDONE 1 MG/1
4 TABLET ORAL ONCE
Status: COMPLETED | OUTPATIENT
Start: 2024-01-10 | End: 2024-01-10

## 2024-01-10 RX ADMIN — RISPERIDONE 4 MG: 1 TABLET ORAL at 12:17

## 2024-01-10 ASSESSMENT — COLUMBIA-SUICIDE SEVERITY RATING SCALE - C-SSRS
6. HAVE YOU EVER DONE ANYTHING, STARTED TO DO ANYTHING, OR PREPARED TO DO ANYTHING TO END YOUR LIFE?: NO
1. IN THE PAST MONTH, HAVE YOU WISHED YOU WERE DEAD OR WISHED YOU COULD GO TO SLEEP AND NOT WAKE UP?: NO
2. HAVE YOU ACTUALLY HAD ANY THOUGHTS OF KILLING YOURSELF?: NO

## 2024-01-10 ASSESSMENT — PAIN - FUNCTIONAL ASSESSMENT: PAIN_FUNCTIONAL_ASSESSMENT: 0-10

## 2024-01-10 ASSESSMENT — PAIN SCALES - GENERAL: PAINLEVEL_OUTOF10: 0 - NO PAIN

## 2024-01-10 NOTE — ED PROVIDER NOTES
Emergency Department Encounter  [facility] EMERGENCY MEDICINE    Patient: Misael Rhodes  MRN: [id number]  : 1990  Date of Evaluation: 1/10/2024  ED Provider: Mechelle Wiseman PA-C      Chief Complaint       Chief Complaint   Patient presents with    Med Refill     HPI    Misael Rhodes is a 33 y.o. male who presents to the emergency department presenting for medication administration.  Patient has a PMH of schizophrenia, takes risperidone 4 mg once daily.  Notes that he is post to have his medication delivered to his home today, however he is concerned as it is almost noon and it has not yet been delivered.  He denies any acute decompensation.  No SI, HI, AVH.  No recent illnesses.  Patient declines needing another prescription since it will be delivered to his home, per usual.    ROS:     Review of Systems  14 systems reviewed and otherwise acutely negative except as in the HPI.    Past History   No past medical history on file.  No past surgical history on file.  Social History     Socioeconomic History    Marital status: Single     Spouse name: Not on file    Number of children: Not on file    Years of education: Not on file    Highest education level: Not on file   Occupational History    Not on file   Tobacco Use    Smoking status: Unknown    Smokeless tobacco: Not on file   Substance and Sexual Activity    Alcohol use: Not on file    Drug use: Not on file    Sexual activity: Not on file   Other Topics Concern    Not on file   Social History Narrative    Not on file     Social Determinants of Health     Financial Resource Strain: Not on file   Food Insecurity: Not on file   Transportation Needs: Not on file   Physical Activity: Not on file   Stress: Not on file   Social Connections: Not on file   Intimate Partner Violence: Not on file   Housing Stability: Not on file       Medications/Allergies     Previous Medications    LIDOCAINE (LIDODERM) 5 % PATCH    Place 1 patch over 12 hours  "on the skin once daily. Remove & discard patch within 12 hours or as directed by MD.    RISPERIDONE (RISPERDAL) 4 MG TABLET    Take 1 tablet (4 mg) by mouth once daily.     No Known Allergies     Physical Exam       ED Triage Vitals [01/10/24 1019]   Temp Heart Rate Resp BP   36.6 °C (97.9 °F) 83 16 101/64      SpO2 Temp Source Heart Rate Source Patient Position   98 % Temporal Monitor Sitting      BP Location FiO2 (%)     Right arm --         Physical Exam    Physical Exam:     VS: As documented in the triage note and EMR flowsheet from this visit were reviewed.    Appearance: Alert, oriented, cooperative, in no acute distress. Well nourished & well hydrated.    Skin: Warm, intact and dry.    Neck: Supple, without meningismus.    Pulmonary: Clear bilaterally with good chest wall excursion. No rales, rhonchi or wheezing. No accessory muscle use or stridor.     Cardiac: Normal S1, S2    Musculoskeletal: Spontaneously moving all extremities without limitation. Extremities warm and well-perfused.    Neurological:  Cranial nerves II through XII are grossly intact.    Psychiatric: Appropriate mood and affect. Kempt appearance. Does not appear internally stimulated.      Diagnostics   N/a    ED Course   Visit Vitals  /64 (BP Location: Right arm, Patient Position: Sitting)   Pulse 83   Temp 36.6 °C (97.9 °F) (Temporal)   Resp 16   Ht 1.676 m (5' 6\")   Wt 68 kg (150 lb)   SpO2 98%   BMI 24.21 kg/m²   Smoking Status Unknown   BSA 1.78 m²     Medications   risperiDONE (RisperDAL) tablet 4 mg (has no administration in time range)       Medical Decision Making     Diagnoses as of 01/10/24 1143   Encounter for medication administration     Patient presents for dose of his home medication, risperidone 4 mg.  He is administered this medication without incident.  Advise follow-up with behavioral health as needed, continue home risperidone as previously prescribed.    Final Impression      1. Encounter for medication " administration          DISPOSITION  Disposition: discharge  Patient condition is: Stable    Comment: Please note this report has been produced using speech recognition software and may contain errors related to that system including errors in grammar, punctuation, and spelling, as well as words and phrases that may be inappropriate.  If there are any questions or concerns please feel free to contact the dictating provider for clarification.    ARMINDA Acosta PA-C  01/10/24 114

## 2024-02-13 ENCOUNTER — APPOINTMENT (OUTPATIENT)
Dept: RADIOLOGY | Facility: HOSPITAL | Age: 34
End: 2024-02-13
Payer: MEDICAID

## 2024-02-13 ENCOUNTER — CLINICAL SUPPORT (OUTPATIENT)
Dept: EMERGENCY MEDICINE | Facility: HOSPITAL | Age: 34
End: 2024-02-13
Payer: MEDICAID

## 2024-02-13 ENCOUNTER — HOSPITAL ENCOUNTER (EMERGENCY)
Facility: HOSPITAL | Age: 34
Discharge: HOME | End: 2024-02-13
Attending: EMERGENCY MEDICINE
Payer: MEDICAID

## 2024-02-13 VITALS
RESPIRATION RATE: 18 BRPM | TEMPERATURE: 98.2 F | SYSTOLIC BLOOD PRESSURE: 103 MMHG | DIASTOLIC BLOOD PRESSURE: 65 MMHG | OXYGEN SATURATION: 98 % | HEART RATE: 84 BPM | BODY MASS INDEX: 26.36 KG/M2 | HEIGHT: 66 IN | WEIGHT: 164 LBS

## 2024-02-13 DIAGNOSIS — R07.9 CHEST PAIN, UNSPECIFIED TYPE: Primary | ICD-10-CM

## 2024-02-13 LAB
ALBUMIN SERPL BCP-MCNC: 4.1 G/DL (ref 3.4–5)
ALP SERPL-CCNC: 71 U/L (ref 33–120)
ALT SERPL W P-5'-P-CCNC: 25 U/L (ref 10–52)
ANION GAP SERPL CALC-SCNC: 13 MMOL/L (ref 10–20)
AST SERPL W P-5'-P-CCNC: 20 U/L (ref 9–39)
BASOPHILS # BLD MANUAL: 0 X10*3/UL (ref 0–0.1)
BASOPHILS NFR BLD MANUAL: 0 %
BILIRUB SERPL-MCNC: 0.2 MG/DL (ref 0–1.2)
BUN SERPL-MCNC: 23 MG/DL (ref 6–23)
CALCIUM SERPL-MCNC: 9.6 MG/DL (ref 8.6–10.6)
CARDIAC TROPONIN I PNL SERPL HS: <3 NG/L (ref 0–53)
CHLORIDE SERPL-SCNC: 105 MMOL/L (ref 98–107)
CO2 SERPL-SCNC: 25 MMOL/L (ref 21–32)
CREAT SERPL-MCNC: 1.07 MG/DL (ref 0.5–1.3)
EGFRCR SERPLBLD CKD-EPI 2021: >90 ML/MIN/1.73M*2
EOSINOPHIL # BLD MANUAL: 0.12 X10*3/UL (ref 0–0.7)
EOSINOPHIL NFR BLD MANUAL: 0.9 %
ERYTHROCYTE [DISTWIDTH] IN BLOOD BY AUTOMATED COUNT: 12.9 % (ref 11.5–14.5)
GLUCOSE SERPL-MCNC: 98 MG/DL (ref 74–99)
HCT VFR BLD AUTO: 41.2 % (ref 41–52)
HGB BLD-MCNC: 14.2 G/DL (ref 13.5–17.5)
IMM GRANULOCYTES # BLD AUTO: 0.07 X10*3/UL (ref 0–0.7)
IMM GRANULOCYTES NFR BLD AUTO: 0.5 % (ref 0–0.9)
LYMPHOCYTES # BLD MANUAL: 4.89 X10*3/UL (ref 1.2–4.8)
LYMPHOCYTES NFR BLD MANUAL: 37.9 %
MCH RBC QN AUTO: 30.7 PG (ref 26–34)
MCHC RBC AUTO-ENTMCNC: 34.5 G/DL (ref 32–36)
MCV RBC AUTO: 89 FL (ref 80–100)
MONOCYTES # BLD MANUAL: 0.55 X10*3/UL (ref 0.1–1)
MONOCYTES NFR BLD MANUAL: 4.3 %
MYELOCYTES # BLD MANUAL: 0.22 X10*3/UL
MYELOCYTES NFR BLD MANUAL: 1.7 %
NEUTS SEG # BLD MANUAL: 7.12 X10*3/UL (ref 1.2–7)
NEUTS SEG NFR BLD MANUAL: 55.2 %
NRBC BLD-RTO: 0 /100 WBCS (ref 0–0)
PLATELET # BLD AUTO: 246 X10*3/UL (ref 150–450)
POTASSIUM SERPL-SCNC: 4.3 MMOL/L (ref 3.5–5.3)
PROT SERPL-MCNC: 6.7 G/DL (ref 6.4–8.2)
RBC # BLD AUTO: 4.62 X10*6/UL (ref 4.5–5.9)
RBC MORPH BLD: ABNORMAL
SODIUM SERPL-SCNC: 139 MMOL/L (ref 136–145)
TOTAL CELLS COUNTED BLD: 116
WBC # BLD AUTO: 12.9 X10*3/UL (ref 4.4–11.3)

## 2024-02-13 PROCEDURE — 85027 COMPLETE CBC AUTOMATED: CPT

## 2024-02-13 PROCEDURE — 85007 BL SMEAR W/DIFF WBC COUNT: CPT

## 2024-02-13 PROCEDURE — 36415 COLL VENOUS BLD VENIPUNCTURE: CPT

## 2024-02-13 PROCEDURE — 84484 ASSAY OF TROPONIN QUANT: CPT

## 2024-02-13 PROCEDURE — 71046 X-RAY EXAM CHEST 2 VIEWS: CPT | Performed by: RADIOLOGY

## 2024-02-13 PROCEDURE — 99284 EMERGENCY DEPT VISIT MOD MDM: CPT | Mod: 25 | Performed by: EMERGENCY MEDICINE

## 2024-02-13 PROCEDURE — 99285 EMERGENCY DEPT VISIT HI MDM: CPT

## 2024-02-13 PROCEDURE — 93010 ELECTROCARDIOGRAM REPORT: CPT

## 2024-02-13 PROCEDURE — 71046 X-RAY EXAM CHEST 2 VIEWS: CPT

## 2024-02-13 PROCEDURE — 80053 COMPREHEN METABOLIC PANEL: CPT

## 2024-02-13 PROCEDURE — 93005 ELECTROCARDIOGRAM TRACING: CPT

## 2024-02-13 RX ORDER — ACETAMINOPHEN 500 MG
1000 TABLET ORAL EVERY 6 HOURS PRN
Qty: 30 TABLET | Refills: 0 | Status: SHIPPED | OUTPATIENT
Start: 2024-02-13 | End: 2024-02-23

## 2024-02-13 RX ORDER — IBUPROFEN 600 MG/1
600 TABLET ORAL EVERY 6 HOURS PRN
Qty: 28 TABLET | Refills: 0 | OUTPATIENT
Start: 2024-02-13 | End: 2024-02-18

## 2024-02-13 RX ORDER — LIDOCAINE 50 MG/G
1 PATCH TOPICAL DAILY
Qty: 6 PATCH | Refills: 0 | Status: SHIPPED | OUTPATIENT
Start: 2024-02-13

## 2024-02-13 ASSESSMENT — LIFESTYLE VARIABLES
EVER FELT BAD OR GUILTY ABOUT YOUR DRINKING: NO
EVER HAD A DRINK FIRST THING IN THE MORNING TO STEADY YOUR NERVES TO GET RID OF A HANGOVER: NO
HAVE YOU EVER FELT YOU SHOULD CUT DOWN ON YOUR DRINKING: NO
HAVE PEOPLE ANNOYED YOU BY CRITICIZING YOUR DRINKING: NO

## 2024-02-13 ASSESSMENT — PAIN DESCRIPTION - DESCRIPTORS: DESCRIPTORS: STABBING

## 2024-02-14 LAB
ATRIAL RATE: 92 BPM
P AXIS: 74 DEGREES
P OFFSET: 202 MS
P ONSET: 156 MS
PR INTERVAL: 130 MS
Q ONSET: 221 MS
QRS COUNT: 15 BEATS
QRS DURATION: 82 MS
QT INTERVAL: 332 MS
QTC CALCULATION(BAZETT): 410 MS
QTC FREDERICIA: 382 MS
R AXIS: 59 DEGREES
T AXIS: 57 DEGREES
T OFFSET: 387 MS
VENTRICULAR RATE: 92 BPM

## 2024-02-14 NOTE — ED PROVIDER NOTES
HPI   Chief Complaint   Patient presents with    Chest Pain   This is an otherwise healthy 34-year-old male who presents to the ED with chest pain.  Patient states that he was lying in bed approximately 3 hours ago when he began to experience intermittent stabbing pains on the left side of his chest, rated 7/10, with no radiation. No falls or trauma. No recent travel. No surgical history. Denies any sick contacts. Patient does endorse that he has been lifting weights recently.   Denies any fevers, chills, dizziness, cough, shortness of breath, abdominal pain, N/V/D, leg pain or swelling.    Patient History   No past medical history on file.  No past surgical history on file.  No family history on file.  Social History     Tobacco Use    Smoking status: Unknown    Smokeless tobacco: Not on file   Substance Use Topics    Alcohol use: Not on file    Drug use: Not on file       Physical Exam   ED Triage Vitals [02/13/24 2010]   Temperature Heart Rate Respirations BP   36.9 °C (98.4 °F) 96 18 121/79      Pulse Ox Temp Source Heart Rate Source Patient Position   100 % Temporal -- --      BP Location FiO2 (%)     -- 21 %       Physical Exam  Constitutional:       General: He is not in acute distress.     Appearance: He is not ill-appearing or toxic-appearing.   Cardiovascular:      Rate and Rhythm: Normal rate and regular rhythm.      Heart sounds: Normal heart sounds.   Pulmonary:      Effort: Pulmonary effort is normal. No respiratory distress.      Breath sounds: No wheezing, rhonchi or rales.   Chest:      Chest wall: Tenderness present.   Abdominal:      General: Bowel sounds are normal.      Palpations: Abdomen is soft.      Tenderness: There is no abdominal tenderness.   Skin:     General: Skin is warm.      Coloration: Skin is not pale.   Neurological:      Mental Status: He is alert and oriented to person, place, and time.      Gait: Gait normal.       ED Course & MDM   ED Course as of 02/13/24 2337   Tue Feb 13,  2024 2251 XR chest 2 views  My independent interpretation shows no acute cardiopulmonary process [LH]      ED Course User Index  [LH] Jamila Montgomery PA-C         Diagnoses as of 02/13/24 2337   Chest pain, unspecified type       Medical Decision Making  This is an otherwise healthy 34-year-old male who presents to the ED with chest pain.  Patient declined any medication for pain while in the ED.   ACS is unlikely given normal EKG and normal troponin. Patient has a negative PERC score, PE is unlikely. Radiology did not show any pneumonia or pneumothorax. Lab work did not show any significant anemia, leukocytosis or electrolyte disturbances.   Given lack of concerning ED workup, patient's recent exercise and that the pain is reproducible on chest palpation, symptoms are most likely musculoskeletal. Patient was given prescriptions for Tylenol, Motrin and lidocaine patches for pain. He was provided the contact information for [facility] to establish care with a PCP for follow up. Return criteria discussed. Patient verbalized understanding and is agreeable with plan of care. Discharged in stable condition.        Jamila Montgomery PA-C  02/13/24 8701

## 2024-02-14 NOTE — ED TRIAGE NOTES
"L sided chest pain starting 3 hours ago. Pt denies radiation of pain. Denies n/v. Pain described as \"stabbing\".   "

## 2024-02-17 ENCOUNTER — HOSPITAL ENCOUNTER (EMERGENCY)
Facility: HOSPITAL | Age: 34
Discharge: LEFT BEFORE DISCHARGE | End: 2024-02-17
Payer: MEDICAID

## 2024-02-17 PROCEDURE — 4500999001 HC ED NO CHARGE

## 2024-02-18 ENCOUNTER — CLINICAL SUPPORT (OUTPATIENT)
Dept: EMERGENCY MEDICINE | Facility: HOSPITAL | Age: 34
End: 2024-02-18
Payer: MEDICAID

## 2024-02-18 ENCOUNTER — HOSPITAL ENCOUNTER (EMERGENCY)
Facility: HOSPITAL | Age: 34
Discharge: HOME | End: 2024-02-18
Payer: MEDICAID

## 2024-02-18 ENCOUNTER — HOSPITAL ENCOUNTER (EMERGENCY)
Facility: HOSPITAL | Age: 34
Discharge: HOME | End: 2024-02-18
Attending: EMERGENCY MEDICINE
Payer: MEDICAID

## 2024-02-18 VITALS
BODY MASS INDEX: 25.71 KG/M2 | OXYGEN SATURATION: 99 % | SYSTOLIC BLOOD PRESSURE: 123 MMHG | TEMPERATURE: 98.6 F | WEIGHT: 160 LBS | HEART RATE: 65 BPM | DIASTOLIC BLOOD PRESSURE: 73 MMHG | HEIGHT: 66 IN | RESPIRATION RATE: 18 BRPM

## 2024-02-18 VITALS
OXYGEN SATURATION: 100 % | HEART RATE: 77 BPM | TEMPERATURE: 98.3 F | WEIGHT: 160 LBS | BODY MASS INDEX: 25.71 KG/M2 | DIASTOLIC BLOOD PRESSURE: 62 MMHG | SYSTOLIC BLOOD PRESSURE: 115 MMHG | RESPIRATION RATE: 18 BRPM | HEIGHT: 66 IN

## 2024-02-18 DIAGNOSIS — Z59.819 HOUSING INSECURITY: Primary | ICD-10-CM

## 2024-02-18 DIAGNOSIS — Z59.00 HOMELESSNESS: ICD-10-CM

## 2024-02-18 DIAGNOSIS — R51.9 ACUTE NONINTRACTABLE HEADACHE, UNSPECIFIED HEADACHE TYPE: Primary | ICD-10-CM

## 2024-02-18 DIAGNOSIS — R07.9 CHEST PAIN, UNSPECIFIED TYPE: ICD-10-CM

## 2024-02-18 LAB
ALBUMIN SERPL BCP-MCNC: 3.5 G/DL (ref 3.4–5)
ALP SERPL-CCNC: 61 U/L (ref 33–120)
ALT SERPL W P-5'-P-CCNC: 21 U/L (ref 10–52)
ANION GAP SERPL CALC-SCNC: 11 MMOL/L (ref 10–20)
AST SERPL W P-5'-P-CCNC: 22 U/L (ref 9–39)
ATRIAL RATE: 61 BPM
BASOPHILS # BLD AUTO: 0.02 X10*3/UL (ref 0–0.1)
BASOPHILS NFR BLD AUTO: 0.2 %
BILIRUB SERPL-MCNC: 0.2 MG/DL (ref 0–1.2)
BUN SERPL-MCNC: 20 MG/DL (ref 6–23)
CALCIUM SERPL-MCNC: 9.3 MG/DL (ref 8.6–10.6)
CHLORIDE SERPL-SCNC: 108 MMOL/L (ref 98–107)
CO2 SERPL-SCNC: 27 MMOL/L (ref 21–32)
CREAT SERPL-MCNC: 1.06 MG/DL (ref 0.5–1.3)
EGFRCR SERPLBLD CKD-EPI 2021: >90 ML/MIN/1.73M*2
EOSINOPHIL # BLD AUTO: 0.17 X10*3/UL (ref 0–0.7)
EOSINOPHIL NFR BLD AUTO: 2 %
ERYTHROCYTE [DISTWIDTH] IN BLOOD BY AUTOMATED COUNT: 13 % (ref 11.5–14.5)
GLUCOSE SERPL-MCNC: 99 MG/DL (ref 74–99)
HCT VFR BLD AUTO: 35 % (ref 41–52)
HGB BLD-MCNC: 11.9 G/DL (ref 13.5–17.5)
IMM GRANULOCYTES # BLD AUTO: 0.05 X10*3/UL (ref 0–0.7)
IMM GRANULOCYTES NFR BLD AUTO: 0.6 % (ref 0–0.9)
LYMPHOCYTES # BLD AUTO: 3.01 X10*3/UL (ref 1.2–4.8)
LYMPHOCYTES NFR BLD AUTO: 35.2 %
MCH RBC QN AUTO: 30.9 PG (ref 26–34)
MCHC RBC AUTO-ENTMCNC: 34 G/DL (ref 32–36)
MCV RBC AUTO: 91 FL (ref 80–100)
MONOCYTES # BLD AUTO: 0.93 X10*3/UL (ref 0.1–1)
MONOCYTES NFR BLD AUTO: 10.9 %
NEUTROPHILS # BLD AUTO: 4.38 X10*3/UL (ref 1.2–7.7)
NEUTROPHILS NFR BLD AUTO: 51.1 %
NRBC BLD-RTO: 0 /100 WBCS (ref 0–0)
P AXIS: 63 DEGREES
P OFFSET: 205 MS
P ONSET: 154 MS
PLATELET # BLD AUTO: 238 X10*3/UL (ref 150–450)
POTASSIUM SERPL-SCNC: 4.2 MMOL/L (ref 3.5–5.3)
PR INTERVAL: 136 MS
PROT SERPL-MCNC: 5.6 G/DL (ref 6.4–8.2)
Q ONSET: 222 MS
QRS COUNT: 10 BEATS
QRS DURATION: 86 MS
QT INTERVAL: 398 MS
QTC CALCULATION(BAZETT): 400 MS
QTC FREDERICIA: 399 MS
R AXIS: 88 DEGREES
RBC # BLD AUTO: 3.85 X10*6/UL (ref 4.5–5.9)
SODIUM SERPL-SCNC: 142 MMOL/L (ref 136–145)
T AXIS: 66 DEGREES
T OFFSET: 421 MS
VENTRICULAR RATE: 61 BPM
WBC # BLD AUTO: 8.6 X10*3/UL (ref 4.4–11.3)

## 2024-02-18 PROCEDURE — 99283 EMERGENCY DEPT VISIT LOW MDM: CPT

## 2024-02-18 PROCEDURE — 2500000001 HC RX 250 WO HCPCS SELF ADMINISTERED DRUGS (ALT 637 FOR MEDICARE OP): Mod: SE

## 2024-02-18 PROCEDURE — 85025 COMPLETE CBC W/AUTO DIFF WBC: CPT | Performed by: EMERGENCY MEDICINE

## 2024-02-18 PROCEDURE — 99283 EMERGENCY DEPT VISIT LOW MDM: CPT | Mod: 25

## 2024-02-18 PROCEDURE — 99284 EMERGENCY DEPT VISIT MOD MDM: CPT | Performed by: EMERGENCY MEDICINE

## 2024-02-18 PROCEDURE — 80053 COMPREHEN METABOLIC PANEL: CPT | Performed by: EMERGENCY MEDICINE

## 2024-02-18 PROCEDURE — 36415 COLL VENOUS BLD VENIPUNCTURE: CPT

## 2024-02-18 PROCEDURE — 93005 ELECTROCARDIOGRAM TRACING: CPT

## 2024-02-18 RX ORDER — IBUPROFEN 600 MG/1
600 TABLET ORAL EVERY 8 HOURS PRN
Qty: 30 TABLET | Refills: 0 | Status: SHIPPED | OUTPATIENT
Start: 2024-02-18

## 2024-02-18 RX ORDER — IBUPROFEN 600 MG/1
600 TABLET ORAL ONCE
Status: COMPLETED | OUTPATIENT
Start: 2024-02-18 | End: 2024-02-18

## 2024-02-18 RX ORDER — PROCHLORPERAZINE MALEATE 10 MG
5 TABLET ORAL ONCE
Status: COMPLETED | OUTPATIENT
Start: 2024-02-18 | End: 2024-02-18

## 2024-02-18 RX ADMIN — IBUPROFEN 600 MG: 600 TABLET, FILM COATED ORAL at 20:03

## 2024-02-18 RX ADMIN — PROCHLORPERAZINE MALEATE 5 MG: 10 TABLET ORAL at 20:03

## 2024-02-18 SDOH — ECONOMIC STABILITY - HOUSING INSECURITY: HOMELESSNESS UNSPECIFIED: Z59.00

## 2024-02-18 SDOH — ECONOMIC STABILITY - HOUSING INSECURITY: HOUSING INSTABILITY UNSPECIFIED: Z59.819

## 2024-02-18 ASSESSMENT — PAIN - FUNCTIONAL ASSESSMENT
PAIN_FUNCTIONAL_ASSESSMENT: 0-10
PAIN_FUNCTIONAL_ASSESSMENT: 0-10

## 2024-02-18 ASSESSMENT — COLUMBIA-SUICIDE SEVERITY RATING SCALE - C-SSRS
2. HAVE YOU ACTUALLY HAD ANY THOUGHTS OF KILLING YOURSELF?: NO
6. HAVE YOU EVER DONE ANYTHING, STARTED TO DO ANYTHING, OR PREPARED TO DO ANYTHING TO END YOUR LIFE?: NO
1. IN THE PAST MONTH, HAVE YOU WISHED YOU WERE DEAD OR WISHED YOU COULD GO TO SLEEP AND NOT WAKE UP?: YES
6. HAVE YOU EVER DONE ANYTHING, STARTED TO DO ANYTHING, OR PREPARED TO DO ANYTHING TO END YOUR LIFE?: NO
1. IN THE PAST MONTH, HAVE YOU WISHED YOU WERE DEAD OR WISHED YOU COULD GO TO SLEEP AND NOT WAKE UP?: YES
2. HAVE YOU ACTUALLY HAD ANY THOUGHTS OF KILLING YOURSELF?: NO

## 2024-02-18 ASSESSMENT — PAIN SCALES - GENERAL
PAINLEVEL_OUTOF10: 7
PAINLEVEL_OUTOF10: 4
PAINLEVEL_OUTOF10: 0 - NO PAIN
PAINLEVEL_OUTOF10: 0 - NO PAIN

## 2024-02-18 ASSESSMENT — LIFESTYLE VARIABLES
HAVE YOU EVER FELT YOU SHOULD CUT DOWN ON YOUR DRINKING: NO
EVER HAD A DRINK FIRST THING IN THE MORNING TO STEADY YOUR NERVES TO GET RID OF A HANGOVER: NO
HAVE PEOPLE ANNOYED YOU BY CRITICIZING YOUR DRINKING: NO
EVER FELT BAD OR GUILTY ABOUT YOUR DRINKING: NO

## 2024-02-18 ASSESSMENT — PAIN DESCRIPTION - LOCATION
LOCATION: HEAD
LOCATION: HEAD

## 2024-02-18 ASSESSMENT — PAIN DESCRIPTION - PAIN TYPE
TYPE: ACUTE PAIN
TYPE: ACUTE PAIN

## 2024-02-18 NOTE — ED TRIAGE NOTES
Pt was sleeping in the back of the lobby he was woke up by [facility] [employer] and asked to leave pt stated that he wanted to check in for a 4/10 headache that he has had for the last 4 hours. Pt also states that he has a hx/of schizophrenia and for the last 3 days he has been having thoughts of wanting to harm himself, he denies having any plan at this time

## 2024-02-18 NOTE — DISCHARGE INSTRUCTIONS
Please continue following with your psychiatrist and primary care provider.  We have provided you with information on coordinated intake and local shelters.

## 2024-02-18 NOTE — ED PROVIDER NOTES
HPI   Chief Complaint   Patient presents with    Headache    Suicidal       Patient is a 34-year-old male with past medical history of schizophrenia on risperidone presenting with chief complaint of needing a job and a place to stay.  Patient sleeping comfortably in triage until he was asked to leave and then endorsed concern of headache.  Patient does endorse headache that started about that time that is mild and not associated with any vision changes, weakness, sensory loss.  Does not endorse any falls or head strike.  Did not endorse wanting any medications for his headache.  Patient reports he is currently homeless.  Does endorse vague thoughts of hurting himself but does not endorse ever having a plan and does not endorse currently having those thoughts.  Denies thoughts of hurting anyone else and denies auditory or visual hallucinations.  Does not endorse alcohol or other drug use.                          [place] Coma Scale Score: 15                     Patient History   History reviewed. No pertinent past medical history.  History reviewed. No pertinent surgical history.  No family history on file.  Social History     Tobacco Use    Smoking status: Unknown    Smokeless tobacco: Not on file   Substance Use Topics    Alcohol use: Not on file    Drug use: Not on file       Physical Exam   ED Triage Vitals [02/18/24 0103]   Temperature Heart Rate Respirations BP   36.8 °C (98.3 °F) 75 16 109/53      Pulse Ox Temp Source Heart Rate Source Patient Position   98 % Temporal Monitor Sitting      BP Location FiO2 (%)     Right arm 21 %       Physical Exam  Vitals and nursing note reviewed.   Constitutional:       General: He is not in acute distress.     Appearance: He is well-developed.   HENT:      Head: Normocephalic and atraumatic.   Eyes:      Conjunctiva/sclera: Conjunctivae normal.   Cardiovascular:      Rate and Rhythm: Normal rate and regular rhythm.      Heart sounds: No murmur heard.  Pulmonary:       Effort: Pulmonary effort is normal. No respiratory distress.      Breath sounds: Normal breath sounds.   Abdominal:      Palpations: Abdomen is soft.      Tenderness: There is no abdominal tenderness.   Musculoskeletal:         General: No swelling.      Cervical back: Neck supple.   Skin:     General: Skin is warm and dry.      Capillary Refill: Capillary refill takes less than 2 seconds.   Neurological:      Mental Status: He is alert.   Psychiatric:         Mood and Affect: Mood normal.         ED Course & MDM   Diagnoses as of 02/19/24 0102   Housing insecurity       Medical Decision Making  Patient is a 34-year-old male with past medical history of schizophrenia on risperidone presenting with chief complaint of needing a job and a place to stay.  Patient with no evidence of decompensated schizophrenia, calm and cooperative on exam and no indication for psychiatric care or repeat referral at this time.  Patient did endorse headache to triage but reports headache is improving and declined medication for this at this time.  No associated neurological symptoms.  Patient endorses primary concern is his homelessness.  Social work not available at this hour.  Patient provided handouts on mental health resources, coordinated intake, shelters in the area.  Patient observed in ED with no evidence of psychosis or return of headache.  Return precautions discussed with patient and patient discharged.    Patient seen and discussed with Dr. Rosanna Aguilar MD, PhD  Emergency Medicine PGY2          Procedure  Procedures     Jeff Aguilar MD  Resident  02/19/24 0100

## 2024-02-19 ENCOUNTER — DOCUMENTATION (OUTPATIENT)
Dept: SOCIAL WORK | Age: 34
End: 2024-02-19

## 2024-02-19 ENCOUNTER — HOSPITAL ENCOUNTER (EMERGENCY)
Facility: HOSPITAL | Age: 34
Discharge: HOME | End: 2024-02-19
Payer: MEDICAID

## 2024-02-19 VITALS
WEIGHT: 160 LBS | DIASTOLIC BLOOD PRESSURE: 84 MMHG | OXYGEN SATURATION: 99 % | RESPIRATION RATE: 18 BRPM | TEMPERATURE: 98 F | HEART RATE: 71 BPM | SYSTOLIC BLOOD PRESSURE: 138 MMHG | BODY MASS INDEX: 25.71 KG/M2 | HEIGHT: 66 IN

## 2024-02-19 PROCEDURE — 4500999001 HC ED NO CHARGE

## 2024-02-19 SDOH — HEALTH STABILITY: MENTAL HEALTH: NON-SPECIFIC ACTIVE SUICIDAL THOUGHTS (PAST 1 MONTH): YES

## 2024-02-19 SDOH — HEALTH STABILITY: MENTAL HEALTH: DEPRESSION SYMPTOMS: NO PROBLEMS REPORTED OR OBSERVED.

## 2024-02-19 SDOH — HEALTH STABILITY: MENTAL HEALTH: ANXIETY SYMPTOMS: NO PROBLEMS REPORTED OR OBSERVED.

## 2024-02-19 SDOH — HEALTH STABILITY: MENTAL HEALTH: SUICIDAL BEHAVIOR (3 MONTHS): NO

## 2024-02-19 SDOH — HEALTH STABILITY: MENTAL HEALTH: WHEN DID YOU TRY TO KILL YOURSELF?: 2016

## 2024-02-19 SDOH — HEALTH STABILITY: MENTAL HEALTH: SUICIDAL BEHAVIOR (LIFETIME): YES

## 2024-02-19 SDOH — ECONOMIC STABILITY: HOUSING INSECURITY

## 2024-02-19 SDOH — HEALTH STABILITY: MENTAL HEALTH: IN THE PAST FEW WEEKS, HAVE YOU FELT THAT YOU OR YOUR FAMILY WOULD BE BETTER OFF IF YOU WERE DEAD?: NO

## 2024-02-19 SDOH — HEALTH STABILITY: MENTAL HEALTH: IN THE PAST WEEK, HAVE YOU BEEN HAVING THOUGHTS ABOUT KILLING YOURSELF?: YES

## 2024-02-19 SDOH — HEALTH STABILITY: MENTAL HEALTH: HAVE YOU EVER TRIED TO KILL YOURSELF?: YES

## 2024-02-19 SDOH — HEALTH STABILITY: MENTAL HEALTH: WISH TO BE DEAD (PAST 1 MONTH): NO

## 2024-02-19 SDOH — HEALTH STABILITY: MENTAL HEALTH: ACTIVE SUICIDAL IDEATION WITH SPECIFIC PLAN AND INTENT (PAST 1 MONTH): NO

## 2024-02-19 SDOH — HEALTH STABILITY: MENTAL HEALTH: ARE YOU HAVING THOUGHTS OF KILLING YOURSELF RIGHT NOW?: NO

## 2024-02-19 SDOH — HEALTH STABILITY: MENTAL HEALTH: ACTIVE SUICIDAL IDEATION WITH SOME INTENT TO ACT, WITHOUT SPECIFIC PLAN (PAST 1 MONTH): NO

## 2024-02-19 SDOH — HEALTH STABILITY: MENTAL HEALTH: IN THE PAST FEW WEEKS, HAVE YOU WISHED YOU WERE DEAD?: NO

## 2024-02-19 SDOH — HEALTH STABILITY: MENTAL HEALTH: HOW DID YOU TRY TO KILL YOURSELF?: OD ON PILLS

## 2024-02-19 ASSESSMENT — COLUMBIA-SUICIDE SEVERITY RATING SCALE - C-SSRS
6. HAVE YOU EVER DONE ANYTHING, STARTED TO DO ANYTHING, OR PREPARED TO DO ANYTHING TO END YOUR LIFE?: NO
1. IN THE PAST MONTH, HAVE YOU WISHED YOU WERE DEAD OR WISHED YOU COULD GO TO SLEEP AND NOT WAKE UP?: NO
2. HAVE YOU ACTUALLY HAD ANY THOUGHTS OF KILLING YOURSELF?: NO
5. HAVE YOU STARTED TO WORK OUT OR WORKED OUT THE DETAILS OF HOW TO KILL YOURSELF? DO YOU INTEND TO CARRY OUT THIS PLAN?: NO
6. HAVE YOU EVER DONE ANYTHING, STARTED TO DO ANYTHING, OR PREPARED TO DO ANYTHING TO END YOUR LIFE?: NO
4. HAVE YOU HAD THESE THOUGHTS AND HAD SOME INTENTION OF ACTING ON THEM?: NO

## 2024-02-19 ASSESSMENT — LIFESTYLE VARIABLES
PRESCIPTION_ABUSE_PAST_12_MONTHS: NO
SUBSTANCE_ABUSE_PAST_12_MONTHS: NO

## 2024-02-19 ASSESSMENT — PAIN SCALES - GENERAL: PAINLEVEL_OUTOF10: 0 - NO PAIN

## 2024-02-19 ASSESSMENT — PAIN - FUNCTIONAL ASSESSMENT: PAIN_FUNCTIONAL_ASSESSMENT: 0-10

## 2024-02-19 NOTE — ED PROVIDER NOTES
HPI   Chief Complaint   Patient presents with    Headache       Limitations to History: None    HPI: This is a 34-year-old male with a past medical history of schizophrenia, homelessness who presents to the emergency room with a headache.  Of note, patient was seen here last night for the same complaint.  He is also endorsing suicidal ideations to me.  He does not have a plan.  He is homeless and is stating that he needs a job and a place to sleep.  He denies any other medical complaints such as fevers, chills, cough, nasal congestion, chest pain, shortness of breath, nausea, vomiting, abdominal pain, or change in bowel habits.  Patient denies any worst headache of his life or thunderclap headaches.  Patient denies any neck stiffness.    Additional History Obtained from: None    12 point review of systems was performed and is negative unless otherwise specified    ------------------------------------------------------------------------------------------------------------------------------------------  Physical Exam:    VS: As documented in the triage note and EMR flowsheet from this visit were reviewed.    CONSTITUTIONAL: Well appearing, well nourished, awake, alert, oriented to person, place, time/situation and in no apparent distress.   EYES: Clear bilaterally, pupils equal, round and reactive to light.   CARDIOVASCULAR: Normal rate, regular rhythm.  Heart sounds S1, S2.  No murmurs, rubs or gallops.  RESPIRATORY: Breath sounds clear and equal bilaterally. No wheezes or rhonchi.   GASTROINTESTINAL: Abdomen soft, non-distended, no rebound, no guarding.   MUSCULOSKELETAL: Spine appears normal, range of motion is not limited, no muscle or joint tenderness.   NEUROLOGICAL: Alert and oriented, no focal deficits, no motor or sensory deficits.  Cranial nerves II through XII intact bilaterally.  No cerebellar ataxia.  Patient is able to ambulate effectively.  SKIN: Skin normal color for race, warm, dry and intact. No  evidence of trauma.   PSYCHIATRIC: Alert and oriented to person, place, time/situation. normal mood and affect. No apparent risk to self or others.     ------------------------------------------------------------------------------------------------------------------------------------------    Medical Decision Making:    This is a 34-year-old male who presents to the emergency room requesting a place to stay with headaches and SI.  Patient's neurological exam is benign and lowers my concern for meningitis, cavernous venous thrombosis, posterior stroke, or subarachnoid hemorrhage.  Patient remained stable throughout course of care.  I do have high clinical suspicion that this patient is presenting with secondary gain given his extensive history and frequent emergency room utilization.  EPAT was consulted and patient was placed on elopement and suicide precautions.  Patient was administered ibuprofen and Compazine for his headache.  EPAT evaluated patient and determined that patient be safely discharged home.  Patient will be discharged home with a prescription for ibuprofen.  Patient was given strict return precautions.  Patient was agreeable to this plan and had no further questions.  I did provide patient with the number for the [facility] for outpatient follow-up.  Patient will be discharged home.  Patient understands that if symptoms worsen or change in any way, they should return for immediate medical attention.  Patient understands that they should follow-up with their primary care physician within the next week to follow-up for headache, homelessness.  Patient was provided a street card.  Patient was stable upon discharge.      External Records Reviewed: I reviewed recent and relevant outside records including: Previous provider notes      Escalation of Care:  Appropriate for outpatient follow-up with primary care provider    Social Determinants Affecting Care:  Schizophrenia,  homelessness    Prescription Drug Consideration: Ibuprofen    Discussion of Management with Other Providers:   I discussed the patient/results with: NESS East PA-C  [facility]  [facility]                            [place] Coma Scale Score: 15                     Patient History   No past medical history on file.  No past surgical history on file.  No family history on file.  Social History     Tobacco Use    Smoking status: Unknown    Smokeless tobacco: Not on file   Substance Use Topics    Alcohol use: Not on file    Drug use: Not on file       Physical Exam   ED Triage Vitals [02/18/24 1903]   Temperature Heart Rate Respirations BP   37.2 °C (99 °F) 74 18 111/70      Pulse Ox Temp Source Heart Rate Source Patient Position   99 % Temporal Monitor Sitting      BP Location FiO2 (%)     Right arm --       Physical Exam    ED Course & MDM        Medical Decision Making      Procedure  Procedures     Jayson De Jesus PA-C  02/18/24 2012

## 2024-02-19 NOTE — PROGRESS NOTES
"EPAT - Social Work Psychiatric Assessment    Arrival Details  Mode of Arrival: Ambulatory  Admission Source: Home  Admission Type: Voluntary  EPAT Assessment Start Date: 02/19/24  EPAT Assessment Start Time: 2030  Name of : LATOYA Chávez LSW    History of Present Illness  Admission Reason: suicidal ideation  HPI: Patient is a 34 year old  male, with a history of schizophrenia, presenting to the ED for suicidal ideation and headache. ED provider note, nursing notes, [place] suicide risk scale and community records reviewed, patient reportedly states he is having vague suicidal ideation, currently homeless and needs a job and a place to sleep. He denies homicidal ideation, visual/auditory hallucinations or delusional thinking. Triage indicates low risk. Patient was seen by this ED this morning for passive SI and headache. Provider put the chief complaint as housing insecurity and discharged him with community resources. Per chart review, patient has several other ED visits in the past three months for SI/HI/psych eval mainly for secondary gain, and several other visits for homelessness.    SW Readmission Information   Readmission within 30 Days: Yes  Previous ED Visit Date and Reason : 2/18 headache and SI  [place] [facility] [place]  Factors Contributing to  Readmission Inpatient/ED (Team Perspective): Homeless  Readmission Factors Team Comments: homelessness  Factors Contributing to Readmission (Patient/Family Perspective): \"needs a place to sleep\"    Psychiatric Symptoms  Anxiety Symptoms: No problems reported or observed.  Depression Symptoms: No problems reported or observed.  Tiana Symptoms: No problems reported or observed.    Psychosis Symptoms  Hallucination Type: No problems reported or observed.  Delusion Type: No problems reported or observed.    Additional Symptoms - Adult  Generalized Anxiety Disorder: Difficult to control worry, Excessive " anxiety/worry  Obsessive Compulsive Disorder: No problems reported or observed.  Panic Attack: No problems reported or observed.  Post Traumatic Stress Disorder: No problems reported or observed.  Delirium: No problems reported or observed.    Past Psychiatric History/Meds/Treatments  Past Psychiatric History: hx of schizophrenia // prior admission in 2016 at [facility] // hx of SA via pill ingestion in 2016 // denies family hx  Past Psychiatric Meds/Treatments: risperdal  Past Violence/Victimization History: none    Current Mental Health Contacts   Name/Phone Number: [facility]   Last Appointment Date: tomorrow  Provider Name/Phone Number: [facility]  Provider Last Appointment Date: unknown    Support System: Community    Living Arrangement: Homeless    Home Safety  Feels Safe Living in Home:  (homeless)    Income Information  Employment Status for: Patient  Employment Status: Disabled  Income Source: Disability    [employer] Service/Education History  Current or Previous  Service: None  Education Level:  (did not assess)  History of Learning Problems:  (did not assess)    Social/Cultural History  Social History: [place] citizen, pt is his own guardian  Cultural Requests During Hospitalization: none  Spiritual Requests During Hospitalization: none  Important Activities: Social    Legal  Legal Considerations: Patient/ Family Ability to Make Healthcare Decisions  Assistance with Managing/Advocating Healthcare Needs:  (self)  Criminal Activity/ Legal Involvement Pertinent to Current Situation/ Hospitalization: none    Drug Screening  Have you used any substances (canabis, cocaine, heroin, hallucinogens, inhalants, etc.) in the past 12 months?: No  Have you used any prescription drugs other than prescribed in the past 12 months?: No  Is a toxicology screen needed?: Yes    Stage of Change  Stage of Change: Action  History of Treatment: Sober living  Type of Treatment Offered:  (thrive)  Treatment Offered:  Declined  Duration of Substance Use: reports only using thc however was residing in a recovery center until three days ago  Frequency of Substance Use: unknown  Age of First Substance Use: unknown    Psychosocial  Psychosocial (WDL): Within Defined Limits  Behaviors/Mood: Cooperative    Orientation  Orientation Level: Oriented X4    General Appearance  Motor Activity: Unremarkable  Speech Pattern:  (regular rate and tone)  General Attitude: Cooperative  Appearance/Hygiene: Unremarkable    Thought Process  Coherency:  (linear)  Content: Unremarkable  Delusions:  (none)  Perception: Not altered  Hallucination: None  Judgment/Insight: Poor  Confusion: None  Cognition: Appropriate safety awareness    Sleep Pattern  Sleep Pattern: Disturbed/interrupted sleep    Risk Factors  Self Harm/Suicidal Ideation Plan: vague SI  Previous Self Harm/Suicidal Plans: none  Risk Factors: Male, Lower socioeconomic status    Violence Risk Assessment  Assessment of Violence: None noted  Thoughts of Harm to Others: No    Ability to Assess Risk Screen  Risk Screen - Ability to Assess: Able to be screened  Ask Suicide-Screening Questions  1. In the past few weeks, have you wished you were dead?: No  2. In the past few weeks, have you felt that you or your family would be better off if you were dead?: No  3. In the past week, have you been having thoughts about killing yourself?: Yes  4. Have you ever tried to kill yourself?: Yes  How did you try to kill yourself?: OD on pills  When did you try to kill yourself?: 2016  5. Are you having thoughts of killing yourself right now?: No  Calculated Risk Score: Potential Risk  [place] Suicide Severity Rating Scale (Screener/Recent Self-Report)  1. Wish to be Dead (Past 1 Month): No  2. Non-Specific Active Suicidal Thoughts (Past 1 Month): Yes  3. Active Suicidal Ideation with any Methods (Not Plan) Without Intent to Act (Past 1 Month): No  4. Active Suicidal Ideation with Some Intent to Act, Without  Specific Plan (Past 1 Month): No  5. Active Suicidal Ideation with Specific Plan and Intent (Past 1 Month): No  6. Suicidal Behavior (Lifetime): Yes  6. Suicidal Behavior (3 Months): No  Calculated C-SSRS Risk Score (Lifetime/Recent): Moderate Risk  Step 1: Risk Factors  Current & Past Psychiatric Dx: Psychotic disorder, Mood disorder  Presenting Symptoms: Anxiety and/or panic  Precipitants/Stressors: Triggering events leading to humiliation, shame, and/or despair (e.g. loss of relationship, financial or health status) (real or anticipated), Pending incarceration or homelessness  Change in Treatment:  (none)  Access to Lethal Methods : No  Step 2: Protective Factors   Protective Factors Internal: Ability to cope with stress, Frustration tolerance, Identifies reasons for living  Protective Factors External: Cultural, spiritual and/or moral attitudes against suicide  Step 3: Suicidal Ideation Intensity  Most Severe Suicidal Ideation Identified: vague SI  How Many Times Have You Had These Thoughts: Less than once a week  When You Have the Thoughts How Long do They Last : Fleeting - few seconds or minutes  Could/Can You Stop Thinking About Killing Yourself or Wanting to Die if You Want to: Easily able to control thoughts  Are There Things - Anyone or Anything - That Stopped You From Wanting to Die or Acting on: Deterrents definitely stopped you from attempting suicide  What Sort of Reasons Did You Have For Thinking About Wanting to Die or Killing Yourself: Completely to get attention, revenge, or a reaction from others  Total Score: 5  Step 5: Documentation  Risk Level: Low suicide risk    Patient is a 34 year old  male, with a history of schizophrenia, presenting to the ED for suicidal ideation and headache. Prior to assessment, patient is calm and cooperative in the ED. Upon assessment, he presents as anxious with affect congruent to mood. Patient endorses difficulty controlling worries, excessive  worries, and helplessness. He denies homicidal ideation, visual/auditory hallucinations or delusional thinking. Patient reports he is currently homeless and endorses suicidal thoughts in the past three days, no plans, previous attempt in 2016 via med ingestion. When being asked what happened three days ago, he states he was kicked out by the recovery center he was living at, “because I had to go to my clinical appointment, so they put me out”, unable to clarify further. Patient reports he has been walking around and napping here and there for the past two days, “I need somewhere to get some sleep”. He states he is connected with [facility] for psychiatry and case management, currently taking Risperdal. He has an upcoming appointment with  tomorrow to discuss housing options. Patient requests for “to get some sleep and food” while in the ED. He does not seem internally stimulated or under acute distress. Patient is able to identify reasons for living and community connection.     Patient does not meet criteria for inpatient admission as he is not posing an immediate risk of harm to himself and others, or being gravely disabled by his mental health. He has a case management appointment tomorrow with [facility]. He is safe to be discharged at this time, Dr. De Jesus in agreement.      Psychiatric Impression and Plan of Care  Assessment and Plan: see above  Specific Resources Provided to Patient: pt is linked with outpatient provider  CM Notified: none  PHP/IOP Recommended: none  Specific Information Provided for PHP/IOP: none    Outcome/Disposition  Patient's Perception of Outcome Achieved: patient agrees  Assessment, Recommendations and Risk Level Reviewed with: Dr. De Jesus  Contact Name: -  Contact Number(s): -  Contact Relationship: -  EPAT Assessment Completed Date: 02/18/24  EPAT Assessment Completed Time: 2058

## 2024-02-20 ENCOUNTER — HOSPITAL ENCOUNTER (EMERGENCY)
Facility: HOSPITAL | Age: 34
Discharge: HOME | End: 2024-02-20
Attending: EMERGENCY MEDICINE
Payer: MEDICAID

## 2024-02-20 VITALS
HEART RATE: 61 BPM | SYSTOLIC BLOOD PRESSURE: 102 MMHG | OXYGEN SATURATION: 98 % | DIASTOLIC BLOOD PRESSURE: 67 MMHG | TEMPERATURE: 98.2 F | RESPIRATION RATE: 16 BRPM

## 2024-02-20 DIAGNOSIS — F20.9 SCHIZOPHRENIA, UNSPECIFIED TYPE (MULTI): Primary | ICD-10-CM

## 2024-02-20 PROCEDURE — 99284 EMERGENCY DEPT VISIT MOD MDM: CPT | Performed by: EMERGENCY MEDICINE

## 2024-02-20 PROCEDURE — 99283 EMERGENCY DEPT VISIT LOW MDM: CPT

## 2024-02-20 PROCEDURE — 2500000002 HC RX 250 W HCPCS SELF ADMINISTERED DRUGS (ALT 637 FOR MEDICARE OP, ALT 636 FOR OP/ED): Mod: SE

## 2024-02-20 RX ORDER — RISPERIDONE 1 MG/1
4 TABLET ORAL ONCE
Status: COMPLETED | OUTPATIENT
Start: 2024-02-20 | End: 2024-02-20

## 2024-02-20 RX ADMIN — RISPERIDONE 4 MG: 1 TABLET, FILM COATED ORAL at 05:12

## 2024-02-20 ASSESSMENT — PAIN SCALES - GENERAL: PAINLEVEL_OUTOF10: 0 - NO PAIN

## 2024-02-20 ASSESSMENT — COLUMBIA-SUICIDE SEVERITY RATING SCALE - C-SSRS
6. HAVE YOU EVER DONE ANYTHING, STARTED TO DO ANYTHING, OR PREPARED TO DO ANYTHING TO END YOUR LIFE?: NO
5. HAVE YOU STARTED TO WORK OUT OR WORKED OUT THE DETAILS OF HOW TO KILL YOURSELF? DO YOU INTEND TO CARRY OUT THIS PLAN?: NO
4. HAVE YOU HAD THESE THOUGHTS AND HAD SOME INTENTION OF ACTING ON THEM?: NO
1. IN THE PAST MONTH, HAVE YOU WISHED YOU WERE DEAD OR WISHED YOU COULD GO TO SLEEP AND NOT WAKE UP?: YES
2. HAVE YOU ACTUALLY HAD ANY THOUGHTS OF KILLING YOURSELF?: NO
6. HAVE YOU EVER DONE ANYTHING, STARTED TO DO ANYTHING, OR PREPARED TO DO ANYTHING TO END YOUR LIFE?: NO

## 2024-02-20 NOTE — ED NOTES
Pt denies plan; states last attempt was 4 years and he tried to overdose on psych pills     Kalli Vale RN  02/20/24 0045

## 2024-02-20 NOTE — ED PROVIDER NOTES
"HPI   Chief Complaint   Patient presents with    Psychiatric Evaluation     \"I need my medicine\"       The patient is a 34-year-old male with past medical history of schizophrenia previous on Invega who presents today for medication request.  Patient states that he would like to have a dose of risperidone and take a nap here to rest.  He denies any homicidal ideation, but states that he does have passive suicidal ideation without a plan.  He states that when he does not take his risperidone, which he has been out of for 3 days, he develops passive suicidal ideation. He reports that once he gets his risperidone, he feels better and will no longer be suicidal. He repeatedly denies any plan to hurt himself. He denies any homicidal ideation.  He denies any auditory or visual hallucinations or delusions.  Record review does demonstrate that he has a psychiatry appointment scheduled at 830 today. No trauma, falls, or injuries.  No passing out. No headache, dizziness, vision changes, neck pain, back pain, cough, chest pain, shortness of breath, nausea, vomiting, abdominal pain, diarrhea, urinary symptoms, numbness, tingling, fevers, or chills.       History provided by:  Patient  History limited by:  Psychiatric disorder   used: No                        [place] Coma Scale Score: 15                     Patient History   No past medical history on file.  No past surgical history on file.  No family history on file.  Social History     Tobacco Use    Smoking status: Unknown    Smokeless tobacco: Not on file   Substance Use Topics    Alcohol use: Not on file    Drug use: Not on file       Physical Exam   ED Triage Vitals   Temperature Heart Rate Respirations BP   02/20/24 0045 02/20/24 0045 02/20/24 0045 02/20/24 0045   37.2 °C (98.9 °F) 66 20 105/57      Pulse Ox Temp Source Heart Rate Source Patient Position   02/20/24 0045 02/20/24 0045 02/20/24 0434 02/20/24 0434   96 % Temporal Monitor Lying      BP " Location FiO2 (%)     02/20/24 0434 --     Right arm        Physical Exam  Vitals and nursing note reviewed.   Constitutional:       Appearance: Normal appearance. He is not ill-appearing, toxic-appearing or diaphoretic.   HENT:      Head: Normocephalic and atraumatic.      Nose: Nose normal.      Mouth/Throat:      Mouth: Mucous membranes are moist.      Pharynx: Oropharynx is clear. No oropharyngeal exudate or posterior oropharyngeal erythema.   Eyes:      General: No scleral icterus.     Extraocular Movements: Extraocular movements intact.      Pupils: Pupils are equal, round, and reactive to light.   Cardiovascular:      Rate and Rhythm: Normal rate and regular rhythm.      Pulses: Normal pulses.      Heart sounds: Normal heart sounds. No murmur heard.     No gallop.   Pulmonary:      Breath sounds: Normal breath sounds. No wheezing, rhonchi or rales.   Chest:      Chest wall: No tenderness.   Abdominal:      General: Abdomen is flat.      Palpations: Abdomen is soft.      Tenderness: There is no abdominal tenderness. There is no guarding or rebound.      Hernia: No hernia is present.   Musculoskeletal:         General: No swelling, deformity or signs of injury. Normal range of motion.      Cervical back: Normal range of motion and neck supple. No rigidity.      Right lower leg: No edema.      Left lower leg: No edema.   Lymphadenopathy:      Cervical: No cervical adenopathy.   Skin:     General: Skin is warm and dry.      Capillary Refill: Capillary refill takes less than 2 seconds.      Findings: No erythema or rash.   Neurological:      General: No focal deficit present.      Mental Status: He is alert and oriented to person, place, and time.   Psychiatric:      Comments: Flat, restricted affect but is calm and cooperative. Does not appear internally stimulated.          ED Course & MDM   Diagnoses as of 02/22/24 0847   Schizophrenia, unspecified type (CMS/[facility])       Medical Decision Making  34-year-old male  with past medical history of schizophrenia presenting today for medication request.  Patient was previously evaluated by GALDINO in the ED yesterday who felt that he was appropriate for discharge with outpatient resources. EPAT did not feel the patient met criteria for inpatient psychiatric admission. He does have a scheduled appointment with his psychiatrist at 830, and given this, we will provide him with a dose of his risperidone here. Patient denies any active suicidal ideation or homicidal ideation. He denies any plan to hurt himself. He reports that once he gets his risperdal, he will feel better. Patient was allowed time to rest and provided food and drink. Upon repeat evaluation, he reports feeling better and feels comfortable being discharged. He denies any pain. He continues to deny any active suicidal or homicidal ideation. He denies having any plan to hurt himself. He does not appear internally stimulated. He reports that he plans to follow up with his psychiatrist at his scheduled appointment today at 8:30 AM. He feels comfortable with this plan.  Given the fact the patient has previously been cleared by EPAT yesterday, had previous EPAT labs with no active suicidal ideation, I do not believe the patient currently requires any labs or any additional workup at this time.  I did strongly recommend he follow-up with his psychiatrist this morning. Patient expressed understanding. The patient felt comfortable being discharged home. The patient was instructed of supportive measures and to follow-up with his psychiatrist and primary care physician. Strict precautions were provided, for which the patient expressed understanding. The patient was discharged home in stable condition. They should feel free to return to the Emergency Department at any time should their condition worsen or should they have any questions or concerns.         Procedure  Procedures     Chinedu Choi MD  Resident  02/22/24 0826    I saw  and evaluated the patient. I personally obtained the key and critical portions of the history and physical exam or was physically present for key and critical portions performed by the resident/fellow. I reviewed the resident/fellow's documentation and discussed the patient with the resident/fellow. I agree with the resident/fellow's medical decision making as documented in the note.    MD Sofie Silver MD  02/22/24 1010

## 2024-02-20 NOTE — DISCHARGE INSTRUCTIONS
You were seen today because you felt suicidal because of not taking her risperidone.  We gave you a dose of risperidone.  You were evaluated by EPAT yesterday and they felt that you are safe to go home.  Given appointment with a psychiatrist at 830 today, you should follow-up with a psychiatrist for possible long-acting medication.  If you begin to have thoughts of hurting yourself with the plan, worsening of hallucinations or delusions, you should return here to the emergency department.

## 2024-02-22 ENCOUNTER — HOSPITAL ENCOUNTER (EMERGENCY)
Facility: HOSPITAL | Age: 34
Discharge: HOME | End: 2024-02-23
Attending: EMERGENCY MEDICINE
Payer: MEDICAID

## 2024-02-22 ENCOUNTER — CLINICAL SUPPORT (OUTPATIENT)
Dept: EMERGENCY MEDICINE | Facility: HOSPITAL | Age: 34
End: 2024-02-22
Payer: MEDICAID

## 2024-02-22 DIAGNOSIS — Z00.8 EVALUATION BY PSYCHIATRIC SERVICE REQUIRED: Primary | ICD-10-CM

## 2024-02-22 LAB
ALBUMIN SERPL BCP-MCNC: 4 G/DL (ref 3.4–5)
ALP SERPL-CCNC: 71 U/L (ref 33–120)
ALT SERPL W P-5'-P-CCNC: 23 U/L (ref 10–52)
AMPHETAMINES UR QL SCN: ABNORMAL
ANION GAP SERPL CALC-SCNC: 11 MMOL/L (ref 10–20)
APAP SERPL-MCNC: <10 UG/ML
APPEARANCE UR: CLEAR
AST SERPL W P-5'-P-CCNC: 17 U/L (ref 9–39)
ATRIAL RATE: 57 BPM
BARBITURATES UR QL SCN: ABNORMAL
BASOPHILS # BLD AUTO: 0.02 X10*3/UL (ref 0–0.1)
BASOPHILS NFR BLD AUTO: 0.3 %
BENZODIAZ UR QL SCN: ABNORMAL
BILIRUB SERPL-MCNC: 0.2 MG/DL (ref 0–1.2)
BILIRUB UR STRIP.AUTO-MCNC: NEGATIVE MG/DL
BUN SERPL-MCNC: 10 MG/DL (ref 6–23)
BZE UR QL SCN: ABNORMAL
CALCIUM SERPL-MCNC: 9.2 MG/DL (ref 8.6–10.6)
CANNABINOIDS UR QL SCN: ABNORMAL
CHLORIDE SERPL-SCNC: 105 MMOL/L (ref 98–107)
CO2 SERPL-SCNC: 27 MMOL/L (ref 21–32)
COLOR UR: NORMAL
CREAT SERPL-MCNC: 1.14 MG/DL (ref 0.5–1.3)
EGFRCR SERPLBLD CKD-EPI 2021: 87 ML/MIN/1.73M*2
EOSINOPHIL # BLD AUTO: 0.14 X10*3/UL (ref 0–0.7)
EOSINOPHIL NFR BLD AUTO: 2 %
ERYTHROCYTE [DISTWIDTH] IN BLOOD BY AUTOMATED COUNT: 12.8 % (ref 11.5–14.5)
ETHANOL SERPL-MCNC: <10 MG/DL
FENTANYL+NORFENTANYL UR QL SCN: ABNORMAL
FLUAV RNA RESP QL NAA+PROBE: NOT DETECTED
FLUBV RNA RESP QL NAA+PROBE: NOT DETECTED
GLUCOSE SERPL-MCNC: 77 MG/DL (ref 74–99)
GLUCOSE UR STRIP.AUTO-MCNC: NORMAL MG/DL
HCT VFR BLD AUTO: 38.7 % (ref 41–52)
HGB BLD-MCNC: 13.3 G/DL (ref 13.5–17.5)
IMM GRANULOCYTES # BLD AUTO: 0.03 X10*3/UL (ref 0–0.7)
IMM GRANULOCYTES NFR BLD AUTO: 0.4 % (ref 0–0.9)
KETONES UR STRIP.AUTO-MCNC: NEGATIVE MG/DL
LEUKOCYTE ESTERASE UR QL STRIP.AUTO: NEGATIVE
LYMPHOCYTES # BLD AUTO: 2.93 X10*3/UL (ref 1.2–4.8)
LYMPHOCYTES NFR BLD AUTO: 41.2 %
MCH RBC QN AUTO: 31.1 PG (ref 26–34)
MCHC RBC AUTO-ENTMCNC: 34.4 G/DL (ref 32–36)
MCV RBC AUTO: 90 FL (ref 80–100)
MONOCYTES # BLD AUTO: 0.59 X10*3/UL (ref 0.1–1)
MONOCYTES NFR BLD AUTO: 8.3 %
NEUTROPHILS # BLD AUTO: 3.41 X10*3/UL (ref 1.2–7.7)
NEUTROPHILS NFR BLD AUTO: 47.8 %
NITRITE UR QL STRIP.AUTO: NEGATIVE
NRBC BLD-RTO: 0 /100 WBCS (ref 0–0)
OPIATES UR QL SCN: ABNORMAL
OXYCODONE+OXYMORPHONE UR QL SCN: ABNORMAL
P AXIS: 47 DEGREES
P OFFSET: 201 MS
P ONSET: 148 MS
PCP UR QL SCN: ABNORMAL
PH UR STRIP.AUTO: 6.5 [PH]
PLATELET # BLD AUTO: 322 X10*3/UL (ref 150–450)
POTASSIUM SERPL-SCNC: 4 MMOL/L (ref 3.5–5.3)
PR INTERVAL: 146 MS
PROT SERPL-MCNC: 6.6 G/DL (ref 6.4–8.2)
PROT UR STRIP.AUTO-MCNC: NEGATIVE MG/DL
Q ONSET: 221 MS
QRS COUNT: 9 BEATS
QRS DURATION: 82 MS
QT INTERVAL: 388 MS
QTC CALCULATION(BAZETT): 377 MS
QTC FREDERICIA: 381 MS
R AXIS: 92 DEGREES
RBC # BLD AUTO: 4.28 X10*6/UL (ref 4.5–5.9)
RBC # UR STRIP.AUTO: NEGATIVE /UL
SALICYLATES SERPL-MCNC: <3 MG/DL
SARS-COV-2 RNA RESP QL NAA+PROBE: NOT DETECTED
SODIUM SERPL-SCNC: 139 MMOL/L (ref 136–145)
SP GR UR STRIP.AUTO: 1.01
T AXIS: 47 DEGREES
T OFFSET: 415 MS
UROBILINOGEN UR STRIP.AUTO-MCNC: NORMAL MG/DL
VENTRICULAR RATE: 57 BPM
WBC # BLD AUTO: 7.1 X10*3/UL (ref 4.4–11.3)

## 2024-02-22 PROCEDURE — 80307 DRUG TEST PRSMV CHEM ANLYZR: CPT | Performed by: EMERGENCY MEDICINE

## 2024-02-22 PROCEDURE — 93010 ELECTROCARDIOGRAM REPORT: CPT

## 2024-02-22 PROCEDURE — 87636 SARSCOV2 & INF A&B AMP PRB: CPT | Performed by: EMERGENCY MEDICINE

## 2024-02-22 PROCEDURE — 93005 ELECTROCARDIOGRAM TRACING: CPT

## 2024-02-22 PROCEDURE — 36415 COLL VENOUS BLD VENIPUNCTURE: CPT | Performed by: EMERGENCY MEDICINE

## 2024-02-22 PROCEDURE — 85025 COMPLETE CBC W/AUTO DIFF WBC: CPT | Performed by: EMERGENCY MEDICINE

## 2024-02-22 PROCEDURE — 99285 EMERGENCY DEPT VISIT HI MDM: CPT | Mod: 25

## 2024-02-22 PROCEDURE — 80143 DRUG ASSAY ACETAMINOPHEN: CPT | Performed by: EMERGENCY MEDICINE

## 2024-02-22 PROCEDURE — 81003 URINALYSIS AUTO W/O SCOPE: CPT | Mod: 59 | Performed by: EMERGENCY MEDICINE

## 2024-02-22 PROCEDURE — 80053 COMPREHEN METABOLIC PANEL: CPT | Performed by: EMERGENCY MEDICINE

## 2024-02-22 PROCEDURE — 99285 EMERGENCY DEPT VISIT HI MDM: CPT | Performed by: EMERGENCY MEDICINE

## 2024-02-22 RX ORDER — ACETAMINOPHEN 325 MG/1
975 TABLET ORAL ONCE
Status: DISCONTINUED | OUTPATIENT
Start: 2024-02-22 | End: 2024-02-23 | Stop reason: HOSPADM

## 2024-02-22 RX ORDER — KETOROLAC TROMETHAMINE 30 MG/ML
30 INJECTION, SOLUTION INTRAMUSCULAR; INTRAVENOUS ONCE
Status: DISCONTINUED | OUTPATIENT
Start: 2024-02-22 | End: 2024-02-23 | Stop reason: HOSPADM

## 2024-02-22 SDOH — HEALTH STABILITY: MENTAL HEALTH: IN THE PAST FEW WEEKS, HAVE YOU WISHED YOU WERE DEAD?: NO

## 2024-02-22 SDOH — SOCIAL STABILITY: SOCIAL INSECURITY: FAMILY BEHAVIORS: UNABLE TO ASSESS

## 2024-02-22 SDOH — HEALTH STABILITY: MENTAL HEALTH: ACTIVE SUICIDAL IDEATION WITH SOME INTENT TO ACT, WITHOUT SPECIFIC PLAN (PAST 1 MONTH): YES

## 2024-02-22 SDOH — SOCIAL STABILITY: SOCIAL NETWORK: VISITOR BEHAVIORS: UNABLE TO ASSESS

## 2024-02-22 SDOH — HEALTH STABILITY: MENTAL HEALTH: SUICIDE ASSESSMENT: ADULT (C-SSRS)

## 2024-02-22 SDOH — SOCIAL STABILITY: SOCIAL NETWORK: EMOTIONAL SUPPORT GIVEN: REASSURE

## 2024-02-22 SDOH — HEALTH STABILITY: MENTAL HEALTH: HAVE YOU HAD THESE THOUGHTS AND HAD SOME INTENTION OF ACTING ON THEM?: NO

## 2024-02-22 SDOH — HEALTH STABILITY: MENTAL HEALTH: WISH TO BE DEAD (PAST 1 MONTH): NO

## 2024-02-22 SDOH — HEALTH STABILITY: MENTAL HEALTH: CONTENT: UNREMARKABLE

## 2024-02-22 SDOH — HEALTH STABILITY: MENTAL HEALTH: WHEN DID YOU TRY TO KILL YOURSELF?: 2016

## 2024-02-22 SDOH — HEALTH STABILITY: MENTAL HEALTH

## 2024-02-22 SDOH — HEALTH STABILITY: MENTAL HEALTH: HAVE YOU BEEN THINKING ABOUT HOW YOU MIGHT DO THIS?: NO

## 2024-02-22 SDOH — HEALTH STABILITY: MENTAL HEALTH: DEPRESSION SYMPTOMS: NO PROBLEMS REPORTED OR OBSERVED.

## 2024-02-22 SDOH — HEALTH STABILITY: MENTAL HEALTH: HAVE YOU ACTUALLY HAD ANY THOUGHTS OF KILLING YOURSELF?: YES

## 2024-02-22 SDOH — SOCIAL STABILITY: SOCIAL NETWORK: PARENT/GUARDIAN/SIGNIFICANT OTHER INVOLVEMENT: NO INVOLVEMENT

## 2024-02-22 SDOH — HEALTH STABILITY: MENTAL HEALTH
HAVE YOU STARTED TO WORK OUT OR WORKED OUT THE DETAILS OF HOW TO KILL YOURSELF? DO YOU INTENT TO CARRY OUT THIS PLAN?: NO

## 2024-02-22 SDOH — HEALTH STABILITY: MENTAL HEALTH: HAVE YOU WISHED YOU WERE DEAD OR WISHED YOU COULD GO TO SLEEP AND NOT WAKE UP?: YES

## 2024-02-22 SDOH — HEALTH STABILITY: MENTAL HEALTH: SUICIDAL BEHAVIOR (LIFETIME): YES

## 2024-02-22 SDOH — HEALTH STABILITY: MENTAL HEALTH: NEEDS EXPRESSED: DENIES

## 2024-02-22 SDOH — HEALTH STABILITY: MENTAL HEALTH: NON-SPECIFIC ACTIVE SUICIDAL THOUGHTS (PAST 1 MONTH): YES

## 2024-02-22 SDOH — HEALTH STABILITY: MENTAL HEALTH: SLEEP PATTERN: UNABLE TO ASSESS

## 2024-02-22 SDOH — HEALTH STABILITY: MENTAL HEALTH: ANXIETY SYMPTOMS: NO PROBLEMS REPORTED OR OBSERVED.

## 2024-02-22 SDOH — HEALTH STABILITY: MENTAL HEALTH: BEHAVIORS/MOOD: CALM;COOPERATIVE

## 2024-02-22 SDOH — HEALTH STABILITY: MENTAL HEALTH: ARE YOU HAVING THOUGHTS OF KILLING YOURSELF RIGHT NOW?: NO

## 2024-02-22 SDOH — ECONOMIC STABILITY: HOUSING INSECURITY

## 2024-02-22 SDOH — HEALTH STABILITY: MENTAL HEALTH: HOW DID YOU TRY TO KILL YOURSELF?: OD ON PILLS

## 2024-02-22 SDOH — HEALTH STABILITY: MENTAL HEALTH: HAVE YOU EVER TRIED TO KILL YOURSELF?: YES

## 2024-02-22 SDOH — HEALTH STABILITY: MENTAL HEALTH: HAVE YOU EVER DONE ANYTHING, STARTED TO DO ANYTHING, OR PREPARED TO DO ANYTHING TO END YOUR LIFE?: NO

## 2024-02-22 SDOH — HEALTH STABILITY: MENTAL HEALTH: ACTIVE SUICIDAL IDEATION WITH SPECIFIC PLAN AND INTENT (PAST 1 MONTH): NO

## 2024-02-22 SDOH — HEALTH STABILITY: MENTAL HEALTH: IN THE PAST FEW WEEKS, HAVE YOU FELT THAT YOU OR YOUR FAMILY WOULD BE BETTER OFF IF YOU WERE DEAD?: NO

## 2024-02-22 SDOH — HEALTH STABILITY: MENTAL HEALTH: SUICIDAL BEHAVIOR (3 MONTHS): NO

## 2024-02-22 SDOH — HEALTH STABILITY: MENTAL HEALTH: DELUSIONS: OTHER (COMMENT)

## 2024-02-22 SDOH — HEALTH STABILITY: MENTAL HEALTH: IN THE PAST WEEK, HAVE YOU BEEN HAVING THOUGHTS ABOUT KILLING YOURSELF?: YES

## 2024-02-22 ASSESSMENT — LIFESTYLE VARIABLES
HAVE YOU EVER FELT YOU SHOULD CUT DOWN ON YOUR DRINKING: NO
PRESCIPTION_ABUSE_PAST_12_MONTHS: NO
HAVE PEOPLE ANNOYED YOU BY CRITICIZING YOUR DRINKING: NO
EVER HAD A DRINK FIRST THING IN THE MORNING TO STEADY YOUR NERVES TO GET RID OF A HANGOVER: NO
SUBSTANCE_ABUSE_PAST_12_MONTHS: NO
EVER FELT BAD OR GUILTY ABOUT YOUR DRINKING: NO

## 2024-02-22 ASSESSMENT — COLUMBIA-SUICIDE SEVERITY RATING SCALE - C-SSRS
1. IN THE PAST MONTH, HAVE YOU WISHED YOU WERE DEAD OR WISHED YOU COULD GO TO SLEEP AND NOT WAKE UP?: YES
5. HAVE YOU STARTED TO WORK OUT OR WORKED OUT THE DETAILS OF HOW TO KILL YOURSELF? DO YOU INTEND TO CARRY OUT THIS PLAN?: NO
2. HAVE YOU ACTUALLY HAD ANY THOUGHTS OF KILLING YOURSELF?: YES
6. HAVE YOU EVER DONE ANYTHING, STARTED TO DO ANYTHING, OR PREPARED TO DO ANYTHING TO END YOUR LIFE?: YES
4. HAVE YOU HAD THESE THOUGHTS AND HAD SOME INTENTION OF ACTING ON THEM?: NO
6. HAVE YOU EVER DONE ANYTHING, STARTED TO DO ANYTHING, OR PREPARED TO DO ANYTHING TO END YOUR LIFE?: NO

## 2024-02-22 ASSESSMENT — PAIN DESCRIPTION - PAIN TYPE: TYPE: ACUTE PAIN

## 2024-02-22 ASSESSMENT — PAIN - FUNCTIONAL ASSESSMENT: PAIN_FUNCTIONAL_ASSESSMENT: 0-10

## 2024-02-22 ASSESSMENT — PAIN SCALES - GENERAL: PAINLEVEL_OUTOF10: 7

## 2024-02-22 NOTE — ED TRIAGE NOTES
Pt to ed after being seen multiple times for a headache. States did not follow up after visits and did not take any otc for headache

## 2024-02-22 NOTE — Clinical Note
Misael Rhodes was seen and treated in our emergency department on 2/22/2024.  He may return to work on 02/24/2024.       If you have any questions or concerns, please don't hesitate to call.      Didi Sam, DO

## 2024-02-23 VITALS
DIASTOLIC BLOOD PRESSURE: 76 MMHG | WEIGHT: 160.05 LBS | BODY MASS INDEX: 25.72 KG/M2 | RESPIRATION RATE: 16 BRPM | TEMPERATURE: 98.4 F | HEIGHT: 66 IN | SYSTOLIC BLOOD PRESSURE: 121 MMHG | OXYGEN SATURATION: 98 % | HEART RATE: 67 BPM

## 2024-02-23 LAB — HOLD SPECIMEN: NORMAL

## 2024-02-23 NOTE — DISCHARGE INSTRUCTIONS
You were evaluated by psychiatry and provided resources and social support services for home-going.  You should keep all of your scheduled appointments.  Feel free to return to the emergency department if you have any concerning symptoms, thoughts of suicide or wanting to harm yourself or others.    [facility]  Phone: [phone]  Address: [place] [address]

## 2024-02-23 NOTE — PROGRESS NOTES
I received Misael Rhodes in signout from previous provider.  Please see the previous note for all HPI, PE and MDM up to the time of signout at 2300.    In brief Misael Rhodes is an 34-year-old male history of schizophrenia previously on Invega presenting to the ED for evaluation of headache.  Has had multiple ED presentations, and reporting that headache is similar to previous and he has not taken medications however upon time of previous providers evaluation patient was noting improvement in his headache.  However patient was endorsing suicidal ideation with a plan to overdose on home Risperdal for which EPAT labs were obtained.  Patient medically cleared for EPAT evaluation, handoff to me pending their evaluation and final recommendations.    Patient was evaluated by EPAT and does not currently meet criteria for inpatient admission as he poses no immediate harm to himself or others. See EPAT documentation for detailed report. Has a case management appointment tomorrow and is safe for discharge at this time.  Patient reportedly additionally has social factors and housing insecurity for which he was provided with resources.  Additionally patient resting comfortably throughout ED course, having no further episodes of headache and was stable for discharge.  Chief Complaint   Patient presents with    Headache        Pt Disposition: Discharge    Procedures

## 2024-02-23 NOTE — PROGRESS NOTES
"EPAT - Social Work Psychiatric Assessment    Arrival Details  Mode of Arrival: Ambulatory  Admission Source:  ([facility])  Admission Type: Voluntary  EPAT Assessment Start Date: 02/22/24  EPAT Assessment Start Time: 2008  Name of : LATOYA Chávez LSW    History of Present Illness  Admission Reason: suicidal ideation  HPI: Patient is a 34 year old  male, with a history of schizophrenia, presenting to the ED for suicidal ideation and headaches. ED provider note, nursing note, [place] suicide risk scale and community records reviewed, patient initially presented with worsening headache, while speaking to provider, he voiced active suicidal ideation with plan to take pills. He denies homicidal ideation, visual/auditory hallucinations or delusional thinking. Triage indicates moderate risk, negative BAL. Patient is currently linked with [facility] for psychiatry and case management, next appointment 2/26. He takes Risperdal. Patient has one prior inpatient admission in 2016 at [facility] via pill ingestion. Per chart review, this is his 6th in the past 7 days. Patient was seen by this service on 2/19 for similar complaints. He was kicked out by the recovery center on 2/16 and has been walking around and going to places to sleep since then. On that day, patient reported he would have an case management appointment to discuss housing options coming up the next day and was discharged.     Readmission Information   Readmission within 30 Days: Yes  Previous ED Visit Date and Reason : 2/20 med refill  Previous Discharge Date and Location: 2/20 [facility]  Factors Contributing to  Readmission Inpatient/ED (Team Perspective): Homeless, Lack of Family/Friend Support, Lack of Community Support  Readmission Factors Team Comments: needs a job and housing  Factors Contributing to Readmission (Patient/Family Perspective): \"I come here to nap\"    Psychiatric Symptoms  Anxiety Symptoms: No problems reported or " observed.  Depression Symptoms: No problems reported or observed.  Tiana Symptoms: No problems reported or observed.    Psychosis Symptoms  Hallucination Type: No problems reported or observed.  Delusion Type: No problems reported or observed.    Additional Symptoms - Adult  Generalized Anxiety Disorder: Difficult to control worry, Difficulity concentrating, Easily fatigued, Excessive anxiety/worry  Obsessive Compulsive Disorder: No problems reported or observed.  Panic Attack: No problems reported or observed.  Post Traumatic Stress Disorder: No problems reported or observed.  Delirium: No problems reported or observed.    Past Psychiatric History/Meds/Treatments  Past Psychiatric History: hx of schizophrenia // prior admission in 2016 at [facility] // hx of SA via pill ingestion in 2016 // denies family hx  Past Psychiatric Meds/Treatments: risperdal  Past Violence/Victimization History: none    Current Mental Health Contacts   Name/Phone Number: [facility]   Last Appointment Date: 2/26  Provider Name/Phone Number: [facility]  Provider Last Appointment Date: 2/20    Support System: Community    Living Arrangement: Homeless    Home Safety  Feels Safe Living in Home:  (currently homeless)    Income Information  Employment Status for: Patient  Employment Status: Unemployed  Income Source: Unemployed    [employer] Service/Education History  Current or Previous  Service: None  Education Level:  (did not assess)  History of Learning Problems:  (did not assess)    Social/Cultural History  Social History: [place] citizen, pt is his own guardian  Cultural Requests During Hospitalization: none  Spiritual Requests During Hospitalization: none  Important Activities: Social    Legal  Legal Considerations: Patient/ Family Ability to Make Healthcare Decisions  Assistance with Managing/Advocating Healthcare Needs:  (self)  Criminal Activity/ Legal Involvement Pertinent to Current Situation/ Hospitalization:  none    Drug Screening  Have you used any substances (canabis, cocaine, heroin, hallucinogens, inhalants, etc.) in the past 12 months?: No  Have you used any prescription drugs other than prescribed in the past 12 months?: No  Is a toxicology screen needed?: Yes    Stage of Change  Stage of Change: Action  History of Treatment: Sober living  Type of Treatment Offered:  (none)  Treatment Offered: Declined  Duration of Substance Use: only on tobacco, denies other substances  Frequency of Substance Use: unknown  Age of First Substance Use: unknown    Psychosocial  Psychosocial (WDL): Within Defined Limits  Behaviors/Mood: Cooperative    Orientation  Orientation Level: Oriented X4    General Appearance  Motor Activity: Unremarkable  Speech Pattern:  (regular rate and tone)  General Attitude: Cooperative  Appearance/Hygiene: Unremarkable    Thought Process  Coherency:  (linear and organized)  Content: Unremarkable  Delusions:  (none)  Perception: Not altered  Hallucination: None  Judgment/Insight: Poor  Confusion: None  Cognition: Appropriate safety awareness    Sleep Pattern  Sleep Pattern: Disturbed/interrupted sleep    Risk Factors  Self Harm/Suicidal Ideation Plan: SI with plan to take pills  Previous Self Harm/Suicidal Plans: none  Risk Factors: Male, Lower socioeconomic status, Unemployment    Violence Risk Assessment  Assessment of Violence: None noted  Thoughts of Harm to Others: No    Ability to Assess Risk Screen  Risk Screen - Ability to Assess: Able to be screened  Ask Suicide-Screening Questions  1. In the past few weeks, have you wished you were dead?: No  2. In the past few weeks, have you felt that you or your family would be better off if you were dead?: No  3. In the past week, have you been having thoughts about killing yourself?: Yes  4. Have you ever tried to kill yourself?: Yes  How did you try to kill yourself?: OD on pills  When did you try to kill yourself?: 2016  5. Are you having thoughts of  killing yourself right now?: No  Calculated Risk Score: Potential Risk  Lee Suicide Severity Rating Scale (Screener/Recent Self-Report)  1. Wish to be Dead (Past 1 Month): No  2. Non-Specific Active Suicidal Thoughts (Past 1 Month): Yes  3. Active Suicidal Ideation with any Methods (Not Plan) Without Intent to Act (Past 1 Month): Yes  4. Active Suicidal Ideation with Some Intent to Act, Without Specific Plan (Past 1 Month): Yes  5. Active Suicidal Ideation with Specific Plan and Intent (Past 1 Month): No  6. Suicidal Behavior (Lifetime): Yes  6. Suicidal Behavior (3 Months): No  6. Suicidal Behavior (Description): OD on pills in 2016  Calculated C-SSRS Risk Score (Lifetime/Recent): High Risk  Step 1: Risk Factors  Current & Past Psychiatric Dx: Psychotic disorder  Presenting Symptoms: Impulsivity, Hopelessness or despair  Precipitants/Stressors: Triggering events leading to humiliation, shame, and/or despair (e.g. loss of relationship, financial or health status) (real or anticipated), Pending incarceration or homelessness  Change in Treatment:  (none)  Access to Lethal Methods : No  Step 2: Protective Factors   Protective Factors Internal: Ability to cope with stress, Frustration tolerance  Protective Factors External: Cultural, spiritual and/or moral attitudes against suicide  Step 3: Suicidal Ideation Intensity  Most Severe Suicidal Ideation Identified: SI with plan to OD  How Many Times Have You Had These Thoughts: 2-5 times in a week  When You Have the Thoughts How Long do They Last : 1-4 hours/a lot of the time  Could/Can You Stop Thinking About Killing Yourself or Wanting to Die if You Want to: Easily able to control thoughts  Are There Things - Anyone or Anything - That Stopped You From Wanting to Die or Acting on: Deterrents definitely stopped you from attempting suicide  What Sort of Reasons Did You Have For Thinking About Wanting to Die or Killing Yourself: Completely to get attention, revenge, or a  reaction from others  Total Score: 9  Step 5: Documentation  Risk Level: Low suicide risk    Patient is a 34 year old  male, with a history of schizophrenia, presenting to the ED for suicidal ideation and headaches. Prior to assessment, patient is calm and cooperative in the ED, comply with all lab works. Upon assessment, he presents as anxious and frustrated with affect congruent to mood. Patient endorses difficulty controlling worries, excessive worries, decreased appetite, suicidal ideation, helplessness, and impulsivity. He denies homicidal ideation, visual/auditory hallucinations or delusional thinking. Patient reports he is still homeless and came to the ED today due to worsening headache. When being asked where he is staying at for the last several days, he states “here, in the hospital, I come here when I need to nap”. He endorses suicidal ideation with a plan to take pills. He rates impulsivity 4.5/10 to actually act on the thoughts if “he cannot find a place to go or find a job”. When being asked about his case management appointment on 2/20, he states “she had to go to the hospital that day so we re-scheduled to next Monday 2/26”. When being asked about his main stressors, he states he would like ED to find him a job and he has no money. When being asked about his reasons for living, he states “no, I don't have any, but I need to [employer] on Tuesday for their open interview”. When being asked if he wants to [facility] to stay, he states “no, that is only 7 days”, informed him that going to a psych unit might be less than 7 days, he states “I just need to stay here to a couple days, then I can go to the interview on Tuesday”. Patient does not seem internally stimulated or under acute distress. [facility] staffing resources, [employer] resources, and food bank open schedule will be provided for him to get help for employment, food and case management.    Patient does not meet  criteria for inpatient admission today as his presentation does not largely depart from his established psychiatric baseline. It would be in patient's best interest to encourage self-efficacy by reaching out to case management and following with outpatient recommended treatments. This  would recommend development of multipledisciplinary plan of care to address his utilization of hospital for secondary gain (housing insecurity, food, employment), Dr. Loyola in agreement.      Psychiatric Impression and Plan of Care  Assessment and Plan: see above  Specific Resources Provided to Patient: employment, case management, crisis hotline, food bank  CM Notified: none  PHP/IOP Recommended: none  Specific Information Provided for PHP/IOP: none    Outcome/Disposition  Patient's Perception of Outcome Achieved: patient agrees  Assessment, Recommendations and Risk Level Reviewed with: Dr. Loyola  Contact Name: -  Contact Number(s): -  Contact Relationship: -  EPAT Assessment Completed Date: 02/22/24  EPAT Assessment Completed Time: 2025

## 2024-02-24 NOTE — ED PROVIDER NOTES
HPI   Chief Complaint   Patient presents with    Headache       HPI  The patient is a 34-year-old male with past medical history of schizophrenia and housing insecurity who presents to the emergency department with the chief complaint of suicidal ideation.  Patient initially complained of a bandlike headache that started 3 hours prior to arrival here.  While in triage he also endorsed the fact that he wanted to take all of his risperidone in an attempt to end his life.  He denies any homicidal ideation.  Denies any symptoms of brandie or hallucinations.  Denies any drug use.  He has been here multiple times over the last week or so for both this same headache and passive suicidal ideation.  He has not had a plan until today.  In regards to his headache, he reports a history of similar headaches in the past. He reports that his headache was gradual in onset, not the worst headache of his life. He denies it being different in severity or quality in the headaches he has been experiencing over the last couple weeks.  Denies any neck pain, neck stiffness or vomiting.  No trauma, falls, or injuries. No passing out.  At the time of interview, patient states his head actually feels better and is requesting to hold off on any pain medication. No dizziness, vision changes, back pain, chest pain, shortness of breath, nausea, vomiting, abdominal pain, diarrhea, constipation, urinary symptoms, numbness, tingling, fevers, or chills.    PMH:as above.  Meds:reviewed in EMR.  PSH:reviewed in EMR.  allergies:reviewed in EMR.  social:Denies X3.  Family History: non-contributory to acute presentation.    A full 10 point Review of Systems was reviewed with the patient and is negative unless stated in the HPI.                  No data recorded                   Patient History   History reviewed. No pertinent past medical history.  History reviewed. No pertinent surgical history.  No family history on file.  Social History     Tobacco Use     Smoking status: Never    Smokeless tobacco: Never   Substance Use Topics    Alcohol use: Not Currently    Drug use: Not Currently       Physical Exam   ED Triage Vitals [02/22/24 1539]   Temperature Heart Rate Respirations BP   37 °C (98.6 °F) 68 20 102/67      Pulse Ox Temp Source Heart Rate Source Patient Position   99 % Oral Monitor --      BP Location FiO2 (%)     -- 21 %       Physical Exam  Physical Exam:    Appearance: Alert, oriented , cooperative,  in no acute distress. Well nourished & well hydrated. Resting calmly.     Head: normocephalic, atraumatic, face is symmetric.     Skin: Intact,  dry skin, no lesions, rash, petechiae or purpura.     Eyes: PERRLA, EOMs intact,  No scleral injection. No scleral icterus.     ENT: Hearing grossly intact. External auditory canals patent. Nares patent, mucus membranes moist. Dentition without lesions.     Neck: Supple, full ROM intact, without meningismus. Trachea at midline.     Pulmonary: Clear to auscultation bilaterally with good chest wall excursion. No rales, rhonchi or wheezing. No accessory muscle use or stridor.    Cardiac: Normal S1, S2 without murmur, rub, gallop or extrasystole. No JVD, Carotids without bruits.    Abdomen: Soft, nontender, nondistended.  No palpable organomegaly.  No rebound or guarding.      Musculoskeletal:  no edema, or deformity. Pulses full and equal. No cyanosis or clubbing.    Neurological:  Moving all 4 extremities equally, no focal findings identified. Cranial nerves 2-12 intact. Strength 5/5 in upper and lower extremities bilaterally. Sensation intact to light touch throughout.     Psychiatric: Reports suicidal ideation. Denies homicidal ideation. Denies auditory or visual hallucinations. Does not appear internally stimulated.     ED Course & MDM   Diagnoses as of 02/24/24 1441   Evaluation by psychiatric service required       Medical Decision Making  The patient is a 34-year-old male with schizophrenia and housing insecurity  who presented with a resolved tension type headache and suicidal ideation with plan to overdose on his Risperdal. In the Emergency Department, hospital records were reviewed. The patient was hemodynamically stable and afebrile on arrival.  When I evaluated him his headache had resolved without medication. He was offered medication for his headache but denied needing anything at this time. Patient is neurologically and neurovascularly intact. No meningeal signs. He reports a history of similar headaches in the past. No trauma, falls, or injuries. No passing out. His headache had been gradual in onset, not the worst headache of his life.  He was also endorsing active suicidal ideation with plan to take all of his Risperdal which he has access to.  Despite his frequent visits recently, the specific plan of self-harm is new for him and so we reached out to EPAT who was consulted for patient evaluation. CBC and CMP were unremarkable. Acute tox panel was negative for all. Urine drug screen was positive for marijuana. Urinalysis showed no evidence of any UTI. Flu and COVID swabs were negative. The patient remains stable. [facility]  He was in stable condition at that time.          Guillermo Loyola MD  Emergency Medicine, PGY3    Procedure  Procedures     Castro Loyola MD  Resident  02/24/24 1417       Sofie Lloyd MD  02/24/24 [id number]

## 2024-03-01 ENCOUNTER — HOSPITAL ENCOUNTER (EMERGENCY)
Facility: HOSPITAL | Age: 34
Discharge: HOME | End: 2024-03-01
Attending: EMERGENCY MEDICINE
Payer: MEDICAID

## 2024-03-01 VITALS
SYSTOLIC BLOOD PRESSURE: 112 MMHG | TEMPERATURE: 98.4 F | BODY MASS INDEX: 25.71 KG/M2 | WEIGHT: 160 LBS | DIASTOLIC BLOOD PRESSURE: 55 MMHG | HEART RATE: 59 BPM | HEIGHT: 66 IN | RESPIRATION RATE: 19 BRPM | OXYGEN SATURATION: 99 %

## 2024-03-01 DIAGNOSIS — G44.201 ACUTE INTRACTABLE TENSION-TYPE HEADACHE: Primary | ICD-10-CM

## 2024-03-01 PROCEDURE — 99284 EMERGENCY DEPT VISIT MOD MDM: CPT | Performed by: EMERGENCY MEDICINE

## 2024-03-01 PROCEDURE — 99282 EMERGENCY DEPT VISIT SF MDM: CPT

## 2024-03-01 RX ORDER — IBUPROFEN 600 MG/1
600 TABLET ORAL ONCE
Status: DISCONTINUED | OUTPATIENT
Start: 2024-03-01 | End: 2024-03-01 | Stop reason: HOSPADM

## 2024-03-01 RX ORDER — ACETAMINOPHEN 325 MG/1
650 TABLET ORAL ONCE
Status: DISCONTINUED | OUTPATIENT
Start: 2024-03-01 | End: 2024-03-01 | Stop reason: HOSPADM

## 2024-03-01 ASSESSMENT — LIFESTYLE VARIABLES
EVER HAD A DRINK FIRST THING IN THE MORNING TO STEADY YOUR NERVES TO GET RID OF A HANGOVER: NO
HAVE PEOPLE ANNOYED YOU BY CRITICIZING YOUR DRINKING: NO
HAVE YOU EVER FELT YOU SHOULD CUT DOWN ON YOUR DRINKING: NO
EVER FELT BAD OR GUILTY ABOUT YOUR DRINKING: NO

## 2024-03-01 ASSESSMENT — PAIN DESCRIPTION - LOCATION: LOCATION: HEAD

## 2024-03-01 ASSESSMENT — PAIN SCALES - GENERAL: PAINLEVEL_OUTOF10: 8

## 2024-03-01 ASSESSMENT — PAIN - FUNCTIONAL ASSESSMENT: PAIN_FUNCTIONAL_ASSESSMENT: 0-10

## 2024-03-01 ASSESSMENT — PAIN DESCRIPTION - PAIN TYPE: TYPE: ACUTE PAIN

## 2024-03-01 NOTE — ED PROVIDER NOTES
HPI   Chief Complaint   Patient presents with    Headache       34-year-old male presenting to the emergency department with headache.  Patient states his symptoms started today, describes it as frontotemporal and pulsating.  Denies affiliated fever, vision changes, lightheadedness or dizziness.  No facial droop, changes in strength or sensation, no changes in balance.  Denies affiliated infectious symptoms including cough congestion or rhinorrhea.  No nausea, vomiting, abdominal pain or diarrhea.  Reports he is otherwise been well.  Patient is requesting a hot pack and a place to sleep as he is currently experiencing housing insecurity.                          [place] Coma Scale Score: 15                     Patient History   History reviewed. No pertinent past medical history.  History reviewed. No pertinent surgical history.  No family history on file.  Social History     Tobacco Use    Smoking status: Never    Smokeless tobacco: Never   Substance Use Topics    Alcohol use: Not Currently    Drug use: Not Currently       Physical Exam   ED Triage Vitals [03/01/24 0118]   Temperature Heart Rate Respirations BP   36.9 °C (98.4 °F) 59 19 112/55      Pulse Ox Temp Source Heart Rate Source Patient Position   99 % Oral -- --      BP Location FiO2 (%)     -- --       Physical Exam  Vitals and nursing note reviewed.   Constitutional:       General: He is not in acute distress.     Appearance: Normal appearance. He is not toxic-appearing.   HENT:      Head: Normocephalic.      Mouth/Throat:      Mouth: Mucous membranes are moist.   Eyes:      Conjunctiva/sclera: Conjunctivae normal.      Pupils: Pupils are equal, round, and reactive to light.   Cardiovascular:      Rate and Rhythm: Normal rate and regular rhythm.      Pulses:           Radial pulses are 2+ on the right side and 2+ on the left side.   Pulmonary:      Effort: Pulmonary effort is normal. No respiratory distress.      Breath sounds: Normal breath sounds.    Abdominal:      General: Abdomen is flat.      Palpations: Abdomen is soft.      Tenderness: There is no abdominal tenderness. There is no guarding or rebound.   Musculoskeletal:      Cervical back: Normal range of motion and neck supple.      Right lower leg: No edema.      Left lower leg: No edema.   Skin:     General: Skin is warm.   Neurological:      Mental Status: He is alert and oriented to person, place, and time.   Psychiatric:         Mood and Affect: Mood normal.         ED Course & MDM   Diagnoses as of 03/01/24 0340   Acute intractable tension-type headache       Medical Decision Making  34-year-old male presenting to the emergency department with headache.  This patient presents with a headache most consistent with benign headache from either tension type headache vs migraine. No headache red flags. Neurologic exam without evidence of meningismus, AMS, focal neurologic findings so doubt meningitis, encephalitis, stroke. Presentation not consistent with acute intracranial bleed to include SAH (lack of risk factors, headache history). No history of trauma so doubt ICH. Given history and physical temporal arteritis unlikely, as is acute angle closure glaucoma. Doubt carotid artery dissection given no focal neuro deficits, no neck trauma or recent neck strain. Patient with no signs of increased intracranial pressure or weight loss and history and physical suggest more benign headache so less likely mass effect in brain from tumor or abscess or idiopathic intracranial hypertension.  Patient declined ibuprofen or Tylenol.  Given a heat pack per his request with improvement of his symptoms.  Patient provided food and allowed to rest in our department.  Do not feel that further workup indicated at this time. Discussed results, diagnosis/differential, and plan with patient. Patient advised to follow up with primary physician in 2-3 days. Discussed return precautions and encouraged patient to return to the  Emergency Department for any concerning symptoms or worsening condition. Patient expresses understanding and is in agreement. All questions answered. Patient discharged in stable condition.        Procedure  Procedures     Joann Preciado DO  Resident  03/01/24 3877

## 2024-03-24 ENCOUNTER — HOSPITAL ENCOUNTER (EMERGENCY)
Facility: HOSPITAL | Age: 34
Discharge: HOME | End: 2024-03-24
Attending: STUDENT IN AN ORGANIZED HEALTH CARE EDUCATION/TRAINING PROGRAM
Payer: MEDICAID

## 2024-03-24 ENCOUNTER — CLINICAL SUPPORT (OUTPATIENT)
Dept: EMERGENCY MEDICINE | Facility: HOSPITAL | Age: 34
End: 2024-03-24
Payer: MEDICAID

## 2024-03-24 VITALS
SYSTOLIC BLOOD PRESSURE: 125 MMHG | TEMPERATURE: 97.5 F | HEIGHT: 66 IN | BODY MASS INDEX: 25.71 KG/M2 | WEIGHT: 160 LBS | DIASTOLIC BLOOD PRESSURE: 78 MMHG | OXYGEN SATURATION: 97 % | RESPIRATION RATE: 16 BRPM | HEART RATE: 99 BPM

## 2024-03-24 DIAGNOSIS — Z87.898 HISTORY OF HOMICIDAL IDEATION: ICD-10-CM

## 2024-03-24 DIAGNOSIS — Z59.00 HOMELESS: Primary | ICD-10-CM

## 2024-03-24 LAB
ALBUMIN SERPL BCP-MCNC: 4.1 G/DL (ref 3.4–5)
ALP SERPL-CCNC: 63 U/L (ref 33–120)
ALT SERPL W P-5'-P-CCNC: 16 U/L (ref 10–52)
AMPHETAMINES UR QL SCN: ABNORMAL
ANION GAP SERPL CALC-SCNC: 13 MMOL/L (ref 10–20)
APAP SERPL-MCNC: <10 UG/ML
APPEARANCE UR: CLEAR
AST SERPL W P-5'-P-CCNC: 17 U/L (ref 9–39)
BARBITURATES UR QL SCN: ABNORMAL
BASOPHILS # BLD AUTO: 0.03 X10*3/UL (ref 0–0.1)
BASOPHILS NFR BLD AUTO: 0.4 %
BENZODIAZ UR QL SCN: ABNORMAL
BILIRUB SERPL-MCNC: 0.5 MG/DL (ref 0–1.2)
BILIRUB UR STRIP.AUTO-MCNC: NEGATIVE MG/DL
BUN SERPL-MCNC: 12 MG/DL (ref 6–23)
BZE UR QL SCN: ABNORMAL
CALCIUM SERPL-MCNC: 9.7 MG/DL (ref 8.6–10.6)
CANNABINOIDS UR QL SCN: ABNORMAL
CHLORIDE SERPL-SCNC: 103 MMOL/L (ref 98–107)
CO2 SERPL-SCNC: 28 MMOL/L (ref 21–32)
COLOR UR: COLORLESS
CREAT SERPL-MCNC: 1.21 MG/DL (ref 0.5–1.3)
EGFRCR SERPLBLD CKD-EPI 2021: 81 ML/MIN/1.73M*2
EOSINOPHIL # BLD AUTO: 0.16 X10*3/UL (ref 0–0.7)
EOSINOPHIL NFR BLD AUTO: 2.2 %
ERYTHROCYTE [DISTWIDTH] IN BLOOD BY AUTOMATED COUNT: 12.9 % (ref 11.5–14.5)
ETHANOL SERPL-MCNC: <10 MG/DL
FENTANYL+NORFENTANYL UR QL SCN: ABNORMAL
GLUCOSE SERPL-MCNC: 101 MG/DL (ref 74–99)
GLUCOSE UR STRIP.AUTO-MCNC: NORMAL MG/DL
HCT VFR BLD AUTO: 43.5 % (ref 41–52)
HGB BLD-MCNC: 14.8 G/DL (ref 13.5–17.5)
HOLD SPECIMEN: NORMAL
IMM GRANULOCYTES # BLD AUTO: 0.07 X10*3/UL (ref 0–0.7)
IMM GRANULOCYTES NFR BLD AUTO: 0.9 % (ref 0–0.9)
KETONES UR STRIP.AUTO-MCNC: NEGATIVE MG/DL
LEUKOCYTE ESTERASE UR QL STRIP.AUTO: NEGATIVE
LYMPHOCYTES # BLD AUTO: 2.77 X10*3/UL (ref 1.2–4.8)
LYMPHOCYTES NFR BLD AUTO: 37.5 %
MCH RBC QN AUTO: 31 PG (ref 26–34)
MCHC RBC AUTO-ENTMCNC: 34 G/DL (ref 32–36)
MCV RBC AUTO: 91 FL (ref 80–100)
METHADONE UR QL SCN: ABNORMAL
MONOCYTES # BLD AUTO: 0.73 X10*3/UL (ref 0.1–1)
MONOCYTES NFR BLD AUTO: 9.9 %
NEUTROPHILS # BLD AUTO: 3.62 X10*3/UL (ref 1.2–7.7)
NEUTROPHILS NFR BLD AUTO: 49.1 %
NITRITE UR QL STRIP.AUTO: NEGATIVE
NRBC BLD-RTO: 0 /100 WBCS (ref 0–0)
OPIATES UR QL SCN: ABNORMAL
OXYCODONE+OXYMORPHONE UR QL SCN: ABNORMAL
PCP UR QL SCN: ABNORMAL
PH UR STRIP.AUTO: 5.5 [PH]
PLATELET # BLD AUTO: 285 X10*3/UL (ref 150–450)
POTASSIUM SERPL-SCNC: 4.1 MMOL/L (ref 3.5–5.3)
PROT SERPL-MCNC: 6.4 G/DL (ref 6.4–8.2)
PROT UR STRIP.AUTO-MCNC: NEGATIVE MG/DL
RBC # BLD AUTO: 4.78 X10*6/UL (ref 4.5–5.9)
RBC # UR STRIP.AUTO: NEGATIVE /UL
SALICYLATES SERPL-MCNC: <3 MG/DL
SODIUM SERPL-SCNC: 140 MMOL/L (ref 136–145)
SP GR UR STRIP.AUTO: 1.01
TSH SERPL-ACNC: 2.31 MIU/L (ref 0.44–3.98)
UROBILINOGEN UR STRIP.AUTO-MCNC: NORMAL MG/DL
WBC # BLD AUTO: 7.4 X10*3/UL (ref 4.4–11.3)

## 2024-03-24 PROCEDURE — 85025 COMPLETE CBC W/AUTO DIFF WBC: CPT | Performed by: STUDENT IN AN ORGANIZED HEALTH CARE EDUCATION/TRAINING PROGRAM

## 2024-03-24 PROCEDURE — 80307 DRUG TEST PRSMV CHEM ANLYZR: CPT | Performed by: STUDENT IN AN ORGANIZED HEALTH CARE EDUCATION/TRAINING PROGRAM

## 2024-03-24 PROCEDURE — 84450 TRANSFERASE (AST) (SGOT): CPT | Performed by: STUDENT IN AN ORGANIZED HEALTH CARE EDUCATION/TRAINING PROGRAM

## 2024-03-24 PROCEDURE — 85025 COMPLETE CBC W/AUTO DIFF WBC: CPT | Performed by: EMERGENCY MEDICINE

## 2024-03-24 PROCEDURE — 93005 ELECTROCARDIOGRAM TRACING: CPT

## 2024-03-24 PROCEDURE — 80143 DRUG ASSAY ACETAMINOPHEN: CPT | Performed by: STUDENT IN AN ORGANIZED HEALTH CARE EDUCATION/TRAINING PROGRAM

## 2024-03-24 PROCEDURE — 99284 EMERGENCY DEPT VISIT MOD MDM: CPT

## 2024-03-24 PROCEDURE — 2500000002 HC RX 250 W HCPCS SELF ADMINISTERED DRUGS (ALT 637 FOR MEDICARE OP, ALT 636 FOR OP/ED): Mod: SE

## 2024-03-24 PROCEDURE — 99285 EMERGENCY DEPT VISIT HI MDM: CPT | Performed by: STUDENT IN AN ORGANIZED HEALTH CARE EDUCATION/TRAINING PROGRAM

## 2024-03-24 PROCEDURE — 93010 ELECTROCARDIOGRAM REPORT: CPT | Performed by: STUDENT IN AN ORGANIZED HEALTH CARE EDUCATION/TRAINING PROGRAM

## 2024-03-24 PROCEDURE — 36415 COLL VENOUS BLD VENIPUNCTURE: CPT | Performed by: STUDENT IN AN ORGANIZED HEALTH CARE EDUCATION/TRAINING PROGRAM

## 2024-03-24 PROCEDURE — 84443 ASSAY THYROID STIM HORMONE: CPT | Performed by: STUDENT IN AN ORGANIZED HEALTH CARE EDUCATION/TRAINING PROGRAM

## 2024-03-24 PROCEDURE — 81003 URINALYSIS AUTO W/O SCOPE: CPT | Mod: 59 | Performed by: STUDENT IN AN ORGANIZED HEALTH CARE EDUCATION/TRAINING PROGRAM

## 2024-03-24 RX ORDER — RISPERIDONE 1 MG/1
4 TABLET ORAL ONCE
Status: COMPLETED | OUTPATIENT
Start: 2024-03-24 | End: 2024-03-24

## 2024-03-24 RX ADMIN — RISPERIDONE 4 MG: 1 TABLET, FILM COATED ORAL at 04:18

## 2024-03-24 SDOH — ECONOMIC STABILITY - HOUSING INSECURITY: HOMELESSNESS UNSPECIFIED: Z59.00

## 2024-03-24 SDOH — HEALTH STABILITY: MENTAL HEALTH: WISH TO BE DEAD (PAST 1 MONTH): NO

## 2024-03-24 SDOH — HEALTH STABILITY: MENTAL HEALTH: SUICIDAL BEHAVIOR (DESCRIPTION): OVERDOSE

## 2024-03-24 SDOH — ECONOMIC STABILITY: GENERAL: FINANCIAL CONCERNS: UNABLE TO MEET BASIC NEEDS;TRANSPORTATION COSTS;UNABLE TO AFFORD FOOD

## 2024-03-24 SDOH — HEALTH STABILITY: MENTAL HEALTH: SUICIDAL BEHAVIOR (LIFETIME): YES

## 2024-03-24 SDOH — HEALTH STABILITY: MENTAL HEALTH: ANXIETY SYMPTOMS: NO PROBLEMS REPORTED OR OBSERVED.

## 2024-03-24 SDOH — HEALTH STABILITY: MENTAL HEALTH: SUICIDAL BEHAVIOR (3 MONTHS): NO

## 2024-03-24 SDOH — HEALTH STABILITY: MENTAL HEALTH: WHEN DID YOU TRY TO KILL YOURSELF?: 2016

## 2024-03-24 SDOH — HEALTH STABILITY: MENTAL HEALTH: HOW DID YOU TRY TO KILL YOURSELF?: OVERDOSE

## 2024-03-24 SDOH — ECONOMIC STABILITY: HOUSING INSECURITY: FEELS SAFE LIVING IN HOME: OTHER (COMMENT)

## 2024-03-24 SDOH — HEALTH STABILITY: MENTAL HEALTH: HAVE YOU EVER TRIED TO KILL YOURSELF?: YES

## 2024-03-24 SDOH — HEALTH STABILITY: MENTAL HEALTH: IN THE PAST FEW WEEKS, HAVE YOU WISHED YOU WERE DEAD?: NO

## 2024-03-24 SDOH — HEALTH STABILITY: MENTAL HEALTH: DEPRESSION SYMPTOMS: FEELINGS OF HELPLESSNESS;FEELINGS OF HOPELESSESS;LOSS OF INTEREST

## 2024-03-24 SDOH — HEALTH STABILITY: MENTAL HEALTH: IN THE PAST WEEK, HAVE YOU BEEN HAVING THOUGHTS ABOUT KILLING YOURSELF?: NO

## 2024-03-24 SDOH — HEALTH STABILITY: MENTAL HEALTH: IN THE PAST FEW WEEKS, HAVE YOU FELT THAT YOU OR YOUR FAMILY WOULD BE BETTER OFF IF YOU WERE DEAD?: NO

## 2024-03-24 SDOH — HEALTH STABILITY: MENTAL HEALTH: NON-SPECIFIC ACTIVE SUICIDAL THOUGHTS (PAST 1 MONTH): NO

## 2024-03-24 SDOH — HEALTH STABILITY: MENTAL HEALTH: ARE YOU HAVING THOUGHTS OF KILLING YOURSELF RIGHT NOW?: NO

## 2024-03-24 ASSESSMENT — LIFESTYLE VARIABLES
EVER FELT BAD OR GUILTY ABOUT YOUR DRINKING: NO
SUBSTANCE_ABUSE_PAST_12_MONTHS: YES
HAVE PEOPLE ANNOYED YOU BY CRITICIZING YOUR DRINKING: NO
TOTAL SCORE: 0
EVER HAD A DRINK FIRST THING IN THE MORNING TO STEADY YOUR NERVES TO GET RID OF A HANGOVER: NO
HAVE YOU EVER FELT YOU SHOULD CUT DOWN ON YOUR DRINKING: NO
PRESCIPTION_ABUSE_PAST_12_MONTHS: NO

## 2024-03-24 ASSESSMENT — PAIN DESCRIPTION - DESCRIPTORS: DESCRIPTORS: NAGGING

## 2024-03-24 ASSESSMENT — COLUMBIA-SUICIDE SEVERITY RATING SCALE - C-SSRS
1. IN THE PAST MONTH, HAVE YOU WISHED YOU WERE DEAD OR WISHED YOU COULD GO TO SLEEP AND NOT WAKE UP?: NO
6. HAVE YOU EVER DONE ANYTHING, STARTED TO DO ANYTHING, OR PREPARED TO DO ANYTHING TO END YOUR LIFE?: NO
2. HAVE YOU ACTUALLY HAD ANY THOUGHTS OF KILLING YOURSELF?: NO

## 2024-03-24 ASSESSMENT — PAIN DESCRIPTION - PAIN TYPE: TYPE: ACUTE PAIN

## 2024-03-24 ASSESSMENT — PAIN - FUNCTIONAL ASSESSMENT: PAIN_FUNCTIONAL_ASSESSMENT: 0-10

## 2024-03-24 ASSESSMENT — PAIN DESCRIPTION - PROGRESSION: CLINICAL_PROGRESSION: NOT CHANGED

## 2024-03-24 ASSESSMENT — PAIN DESCRIPTION - ORIENTATION: ORIENTATION: RIGHT

## 2024-03-24 ASSESSMENT — PAIN SCALES - GENERAL: PAINLEVEL_OUTOF10: 3

## 2024-03-24 ASSESSMENT — PAIN DESCRIPTION - LOCATION: LOCATION: ANKLE

## 2024-03-24 NOTE — PROGRESS NOTES
"Emergency Medicine Transition of Care Note.    I received Misael Rhodes in signout from previous team.  Please see the previous ED provider note for all HPI, PE and MDM up to the time of signout at 0700. This is in addition to the primary record.    In brief Misael Rhodes is an 34 y.o. male presenting for passive homicidal ideation.  Patient has a past medical history of schizophrenia and homelessness.  He has been to multiple ERs in the past week.  He notes that he does not have any plans on hurting a particular person and does not have a plan.  He \"does not want to hurt anyone\".  Denies SI, auditory or visual hallucinations.  He has a therapist and psychiatrist but reports that they are not helping and has been off his risperidone for multiple weeks, though has an active prescription.  At the time of signout we were awaiting: EPAT evaluation    EPAT evaluated this patient.  They report that he is well-known to them.  He told them that he has no active plan of homicidal ideation.  Per their evaluation, this patient does not need inpatient psychiatric admission.  They believe that he has appropriate support outpatient.  Per their request, consulting social work due to patient's housing needs/food insecurity.    Social work gave patient street card, information on shelters and food sherri.    Diagnoses as of 03/24/24 1528   Homeless   History of homicidal ideation       Labs Reviewed   COMPREHENSIVE METABOLIC PANEL - Abnormal       Result Value    Glucose 101 (*)     Sodium 140      Potassium 4.1      Chloride 103      Bicarbonate 28      Anion Gap 13      Urea Nitrogen 12      Creatinine 1.21      eGFR 81      Calcium 9.7      Albumin 4.1      Alkaline Phosphatase 63      Total Protein 6.4      AST 17      Bilirubin, Total 0.5      ALT 16     DRUG SCREEN,URINE - Abnormal    Amphetamine Screen, Urine Presumptive Negative      Barbiturate Screen, Urine Presumptive Negative      Benzodiazepines Screen, Urine " Presumptive Negative      Cannabinoid Screen, Urine Presumptive Positive (*)     Cocaine Metabolite Screen, Urine Presumptive Negative      Fentanyl Screen, Urine Presumptive Negative      Opiate Screen, Urine Presumptive Negative      Oxycodone Screen, Urine Presumptive Negative      PCP Screen, Urine Presumptive Negative      Methadone Screen, Urine Presumptive Negative      Narrative:     Drug screen results are presumptive and should not be used to assess   compliance with prescribed medication. Contact the performing [facility] laboratory   to add-on definitive confirmatory testing if clinically indicated.    Toxicology screening results are reported qualitatively. The concentration must   be greater than or equal to the cutoff to be reported as positive. The concentration   at which the screening test can detect an individual drug or metabolite varies.   The absence of expected drug(s) and/or drug metabolite(s) may indicate non-compliance,   inappropriate timing of specimen collection relative to drug administration, poor drug   absorption, diluted/adulterated urine, or limitations of testing. For medical purposes   only; not valid for forensic use.    Interpretive questions should be directed to the laboratory medical directors.   URINALYSIS WITH REFLEX CULTURE AND MICROSCOPIC - Abnormal    Color, Urine Colorless (*)     Appearance, Urine Clear      Specific Gravity, Urine 1.006      pH, Urine 5.5      Protein, Urine NEGATIVE      Glucose, Urine Normal      Blood, Urine NEGATIVE      Ketones, Urine NEGATIVE      Bilirubin, Urine NEGATIVE      Urobilinogen, Urine Normal      Nitrite, Urine NEGATIVE      Leukocyte Esterase, Urine NEGATIVE     TSH WITH REFLEX TO FREE T4 IF ABNORMAL - Normal    Thyroid Stimulating Hormone 2.31      Narrative:     TSH testing is performed using different testing methodology at [facility] than at other [facility]. Direct result comparisons should only be made within  the same method.     ACUTE TOXICOLOGY PANEL, BLOOD - Normal    Acetaminophen <10.0      Salicylate  <3      Alcohol <10     CBC WITH AUTO DIFFERENTIAL    WBC 7.4      nRBC 0.0      RBC 4.78      Hemoglobin 14.8      Hematocrit 43.5      MCV 91      MCH 31.0      MCHC 34.0      RDW 12.9      Platelets 285      Neutrophils % 49.1      Immature Granulocytes %, Automated 0.9      Lymphocytes % 37.5      Monocytes % 9.9      Eosinophils % 2.2      Basophils % 0.4      Neutrophils Absolute 3.62      Immature Granulocytes Absolute, Automated 0.07      Lymphocytes Absolute 2.77      Monocytes Absolute 0.73      Eosinophils Absolute 0.16      Basophils Absolute 0.03     URINALYSIS WITH REFLEX CULTURE AND MICROSCOPIC    Narrative:     The following orders were created for panel order Urinalysis with Reflex Culture and Microscopic.  Procedure                               Abnormality         Status                     ---------                               -----------         ------                     Urinalysis with Reflex C...[[id number]]  Abnormal            Final result               Extra Urine Gray Tube[[id number]]                                                         Please view results for these tests on the individual orders.   EXTRA URINE GRAY TUBE       No orders to display         Medical Decision Making      Final diagnoses:   None           Procedure  Procedures    Beth Osuna PA-C

## 2024-03-24 NOTE — PROGRESS NOTES
"   03/24/24 1759   Discharge Planning   Type of Residence Homeless   Who is requesting discharge planning? Patient   Home or Post Acute Services None   Patient expects to be discharged to: [facility]   Does the patient need discharge transport arranged? No   RoundTrip coordination needed? No   Has discharge transport been arranged? Yes  (bus passes provided to patient)   What day is the transport expected? 03/24/24   What time is the transport expected? 1800   Financial Resource Strain   How hard is it for you to pay for the very basics like food, housing, medical care, and heating? Somewhat   Housing Stability   In the last 12 months, was there a time when you were not able to pay the mortgage or rent on time? N   In the last 12 months, how many places have you lived? 0   In the last 12 months, was there a time when you did not have a steady place to sleep or slept in a shelter (including now)? Y  (Patient reports sleeping on \"porches of abandoned houses, the casino, the hospital, and/or outside\".)     Misael Rhodes is a 34 y.o. male. Patient is currently in the ED and would like resources for housing. Patient says he has recently connected with \"[employer]\", a temporary employment program, and would like to start working [employer].   offered patient dates/times of free produce days, and information for the local food bank, where patient can order online and  a box of items. Patient also offered sober living resources through [employer] in [place] - patient could go there today. Patient spoke to Jose Guadalupe, an employee, and decided that he would like to start working instead. The program requires no working for the first month. Patient states he would rather be homeless and \"get dollars\" vs not working and having housing/food/utilities paid for.   provided patient with two bus passes, and a clean change of clothing.  Patient's personal belongings (2 bags) will be provided to patient in the " melisa.

## 2024-03-24 NOTE — ED TRIAGE NOTES
Pt to ED c/o right ankle pain. Pt denies any obvious injury to ankle. Pt ambulatory to ED Pt was seen twice yesterday at [facility] for same complaint and had xrays done with ace wrap applied. Pt endorsing homicidal thoughts, no one specifically, generalized. Pt denies SI, auditory & visual hallucinations. Pt has PMH of schizophrenia

## 2024-03-24 NOTE — PROGRESS NOTES
EPAT - Social Work Psychiatric Assessment    Arrival Details  Mode of Arrival: Ambulatory  Admission Source: Home  Admission Type: Voluntary  EPAT Assessment Start Date: 03/24/24  EPAT Assessment Start Time: 1330  Name of : Teresa Redding [facility]    History of Present Illness  Admission Reason: Homicidal Ideation  HPI: Patient, Misael Rhodes, is a 34 year old male with history of schizophrenia. Patient presented to ED with primary medical complaint and secondary psychiatric complaint. Patient reportedly experiencing passive homicidal ideation. Patient reported having no specific target, plan, or intention related to homicidal ideation. Patient denied any active suicidal ideation but reported remote history of suicide attempt. Patient reported having a current therapist and psychiatrist in the community. Patient reported feeling as if therapist and psychiatrist are to helping with current symptoms. Patient reported non-compliance with at home Risperdal medications for multiple weeks. Patient denied active hallucinations with ED provider.    Patient’s chart, community record, provider note, triage note, labs, and C-SSRS score reviewed. Patient’s chart shows history of mental health diagnoses, EPAT assessments, and inpatient psychiatric hospitalizations. Patient’s most recent EPAT assessment noted on 02/19/2024 with recommendation for discharge. Patient’s most recent inpatient psychiatric hospitalization noted in 2016 at [facility]. Patient’s C-SSRS scored at “no risk” in triage.     Patient declined collateral contact during assessement.    SW Readmission Information   Readmission within 30 Days: Yes  Previous ED Visit Date and Reason : 03/23/2024, Foot Pain  Previous Discharge Date and Location: 03/23/2024, [facility]  Factors Contributing to  Readmission Inpatient/ED (Team Perspective): Homeless, Lack of Community Support, Med Compliance/Difficulty Obtaining, Transportation Issues, Lack of Family/Friend  Support  Readmission Factors Team Comments: Chronic mental health symptoms and medication non-compliance.  Factors Contributing to Readmission (Patient/Family Perspective): Chronic mental health illness and medication non-compliance.    Psychiatric Symptoms  Anxiety Symptoms: No problems reported or observed.  Depression Symptoms: Feelings of helplessness, Feelings of hopelessess, Loss of interest  Tiana Symptoms: No problems reported or observed.    Psychosis Symptoms  Hallucination Type: No problems reported or observed.  Delusion Type: No problems reported or observed.    Additional Symptoms - Adult  Generalized Anxiety Disorder: Excessive anxiety/worry  Obsessive Compulsive Disorder: No problems reported or observed.  Panic Attack: No problems reported or observed.  Post Traumatic Stress Disorder: No problems reported or observed.  Delirium: No problems reported or observed.  Review of Symptoms Comments: Patient reported feelings of sadness, loss of interest, helplessness, and hopelessness. Patient denied any active suicidal ideation. Patient reported last experience of suicidal ideation was around three years prior to current ED visit. Patient reported history of suicide attempt via overdose on tylenol in 2016. Patient reported passive homicidal ideation with no active plan, target, or means. Patient denied experiencing halucinations.    Past Psychiatric History/Meds/Treatments  Past Psychiatric History: Patient has history of schizophrenia.  Past Psychiatric Meds/Treatments: Patient reported current prescription of risperdal for symptom management. Patient reported non-compliance with medication recently due to not wanting to take medication. Patient has history of inpatient hospitalization with most recent noted at [facility] in 2016.  Past Violence/Victimization History: Unreported    Current Mental Health Contacts   Name/Phone Number: [facility]   Last  Appointment Date: Patient reported intermittent contact. No specific date reported.  Provider [facility] Number[facility]  Provider Last Appointment Date: Unreported    Support System: Community    Living Arrangement: Homeless    Home Safety  Feels Safe Living in Home: Other (Comment) (Patient reportedly homeless.)  Potentially Unsafe Housing Conditions: Unable to Assess  Home Safety : Patient reportedly homeless.    Income Information  Employment Status for: Patient  Employment Status: Unemployed  Income Source: Unemployed  Current/Previous Occupation: Unable to Assess  Shift Worked:  (Unreported)  Income/Expense Information: Expenses exceed income  Financial Concerns: Unable to Meet Basic Needs, Transportation Costs, Unable to Afford Food  Who Manages Finances if Patient Unable: Unreported  Employment/ Finance Comments: Unreported    [place] Service/Education History  Current or Previous  Service: None   Experience: Other (Comment) (Unreported)  Education Level: High school  History of Learning Problems: No  History of School Behavior Problems: No  School History: Patient reported completing high school and a few semesters of college. No learning problems reported.    Social/Cultural History  Social History: Patient is a 34 year old  male with brown skin, brown hair, wearing hospital gear. Patient appeared poorly groomed and close to stated age.  Cultural Requests During Hospitalization: None reported  Spiritual Requests During Hospitalization: None reported  Important Activities: Social    Legal  Legal Considerations: Patient/ Family Ability to Make Healthcare Decisions  Assistance with Managing/Advocating Healthcare Needs: Other (Comment) (Unreported)  Criminal Activity/ Legal Involvement Pertinent to Current Situation/ Hospitalization: Unreported  Legal Concerns: Unreported  Legal Comments: Unreported    Drug Screening  Have you used any substances (canabis, cocaine, heroin,  hallucinogens, inhalants, etc.) in the past 12 months?: Yes  Have you used any prescription drugs other than prescribed in the past 12 months?: No  Is a toxicology screen needed?: Yes    Stage of Change  Stage of Change: Precontemplation  History of Treatment: Other (Comment) (Unreported)  Type of Treatment Offered: Individual  Treatment Offered: Resources/education provided  Duration of Substance Use: Unreported  Frequency of Substance Use: Unreported  Age of First Substance Use: Unreported    Psychosocial  Psychosocial (WDL): Exceptions to WDL  Behaviors/Mood: Calm, Cooperative, Pleasant  Affect: Appropriate to circumstances  Parent/Guardian/Significant Other Involvement: No involvement  Family Behaviors: Unable to assess  Visitor Behaviors: Unable to assess  Needs Expressed: Financial  Emotional Support Given: Patient counseling    Orientation  Orientation Level: Oriented X4    General Appearance  Motor Activity: Unremarkable  Speech Pattern: Other (Comment) (Unremarkable)  General Attitude: Attentive, Cooperative  Appearance/Hygiene: Body odor, Poor hygiene, Soiled clothes    Thought Process  Coherency: Circumstantial  Content: Blaming others  Delusions: Controlled  Perception: Not altered  Hallucination: None  Judgment/Insight: Limited  Confusion: None  Cognition: Follows commands, Poor safety awareness    Sleep Pattern  Sleep Pattern: Sleeps all night    Risk Factors  Self Harm/Suicidal Ideation Plan: Patient denied active suicidal ideation.  Previous Self Harm/Suicidal Plans: Patient reported history of suicide attempt via overdose in 2016.  Risk Factors: Homicidal ideation, Lower socioeconomic status, Major mental illness, Male, Unemployment  Description of Thoughts/Ideas Leaving Unit Now: Patient denied active suicidal ideation and resolving homicidal ideation. Patient voiced desire for housing assistance.    Violence Risk Assessment  Assessment of Violence: On admission  Thoughts of Harm to Others: Yes -  currently present  Description of Violence Behavior: Patient reported passive homicidal ideation with no intended victim, no intention, and no means reported.  Homicidal ideation: Yes - currently present  Current Homicidal Intent: No  Current Homicidal Plan: No  Access to Homicidal Means: No  Identified Victim: None reported  History of Harm to Others: No  Access to Weapons: No  Criminal Charges Pending: No    Ability to Assess Risk Screen  Risk Screen - Ability to Assess: Able to be screened  Ask Suicide-Screening Questions  1. In the past few weeks, have you wished you were dead?: No  2. In the past few weeks, have you felt that you or your family would be better off if you were dead?: No  3. In the past week, have you been having thoughts about killing yourself?: No  4. Have you ever tried to kill yourself?: Yes  How did you try to kill yourself?: Overdose  When did you try to kill yourself?: 2016  5. Are you having thoughts of killing yourself right now?: No  Calculated Risk Score: Potential Risk  [place] Suicide Severity Rating Scale (Screener/Recent Self-Report)  1. Wish to be Dead (Past 1 Month): No  2. Non-Specific Active Suicidal Thoughts (Past 1 Month): No  6. Suicidal Behavior (Lifetime): Yes  6. Suicidal Behavior (3 Months): No  6. Suicidal Behavior (Description): Overdose  Calculated C-SSRS Risk Score (Lifetime/Recent): Moderate Risk  Step 1: Risk Factors  Current & Past Psychiatric Dx: Psychotic disorder  Presenting Symptoms: Other (Comment) (Passive homicidal ideation)  Family History: Other (Comment) (Unreported)  Precipitants/Stressors: Triggering events leading to humiliation, shame, and/or despair (e.g. loss of relationship, financial or health status) (real or anticipated), Pending incarceration or homelessness, Inadequate social supports, Social isolation  Change in Treatment: Hopeless or dissatisfied with provider or treatment  Access to Lethal Methods : No  Step 2: Protective Factors    Protective Factors Internal: Identifies reasons for living, Frustration tolerance  Protective Factors External: Positive therapeutic relationships  Step 3: Suicidal Ideation Intensity  Most Severe Suicidal Ideation Identified: Patient denied active suicidal ideation. Patient reported history of suicide attempt via overdose in 2016.  How Many Times Have You Had These Thoughts: Less than once a week  When You Have the Thoughts How Long do They Last : Fleeting - few seconds or minutes  Could/Can You Stop Thinking About Killing Yourself or Wanting to Die if You Want to: Can control thoughts with little difficulty  Are There Things - Anyone or Anything - That Stopped You From Wanting to Die or Acting on: Does not apply  What Sort of Reasons Did You Have For Thinking About Wanting to Die or Killing Yourself: Does not apply  Total Score: 4  Step 5: Documentation  Risk Level: Low suicide risk    Psychiatric Impression and Plan of Care  Assessment and Plan: Patient, Misael Rhodes, is a 34 year old male with history of schizophrenia. Patient presented to ED with primary medical complaint and secondary psychiatric complaint. Patient discussed reason for ED visit stating “my ankle was hurting”. Patient questioned about homicidal ideation initially reported to ED provider. Patient reportedly experiencing passive homicidal ideation and having a “little bit” of symptoms lingering since initial report. Patient reported having no specific target, plan, or intention related to homicidal ideation. Further into the assessment patient noted “I don’t want to hurt anyone”. Patient reported some increased depression symptoms with mention of increased sadness, loss of interest, helplessness, and hopelessness feelings. Patient denied any acute changes eating and sleeping habits. Patient reported feeling as if some symptoms of anxiety, depression, and homicidal ideation were improving with being in the warm hospital bed and being off of  feet. Patient denied recent substance use and declined resources from [facility]. Patient denied recent marijuana use which showed up as a positive value in UDS. Patient denied active suicidal ideation. Patient reported history of suicide attempt via overdose in 2016. Patient reported having no suicidal thoughts in around three years. Patient’s C-SSRS scored at low risk due to no active ideation reported. Patient’s overall lifetime risk may remain elevated at moderate risk due to history of suicide attempt. Patient reported having outpatient  and psychiatrist [facility]. Patient reported feeling limited support with current outpatient providers due to feeling as if the  is lazy due to slow process for patient’s housing needs. Patient reported some openness to looking for alternative outpatient care in the community. Patient able to identify supportive people including . Patient able to identify reason for living being getting a job and housing. Patient reported EPAT could help patient by helping patient get a job and a house. Patient does not currently meet criteria for inpatient hospitalization due to low risk of harm to self, resolving homicidal ideation, and current baseline functioning. Patient encouraged to follow up with current outpatient providers, explore care options with [facility] or [facility], call 9-1-1, call crisis hotline, and return to ED if symptoms worsen. Patient recommended for discharge. Plan for care discussed with and approved by Dr. Ventura.      Specific Resources Provided to Patient: Patient encouraged to follow up with current outpatient providers, explore care options with [facility] or [facility], call 9-1-1, call crisis hotline, and return to ED if symptoms worsen.  CM Notified: -  PHP/IOP Recommended: Not at this time  Specific Information Provided for PHP/IOP: None a this time  Plan Comments: Diagnosis:  Schizophrenia    Outcome/Disposition  Patient's Perception of Outcome Achieved: Patient voicing desire to get help with housing and employment.  Assessment, Recommendations and Risk Level Reviewed with: Dr. Ventura  Contact Name: Barbie Rhodes  Contact Number(s): [phone]  Contact Relationship: Patient's mother  EPAT Assessment Completed Date: 03/24/24  EPAT Assessment Completed Time: 1605  Patient Disposition: Home

## 2024-03-24 NOTE — ED PROVIDER NOTES
CC: Psychiatric Evaluation     HPI:   Patient is a 34-year-old male with past medical history of schizophrenia, homelessness presenting due to right ankle pain and passive homicidal ideation.  Patient reports that his ankle has been hurting and feels like it is his lateral malleolus that is getting hit against the side of his hard shoe.  Patient initially has been using Ace wrap that was helping however he reports that it was difficult to reapply and has not applied it today.  Patient also reports earlier today he has been having passive homicidal ideation without any particular person that he plans on hurting and does not have a plan.  He denies any suicidal ideation but does have a history of that.  He has a therapist and a psychiatrist but reports they are not helping and he has been off of his Risperdal for multiple weeks.  He denies any visual or auditory hallucinations, fevers or chills, chest pain, shortness of breath, abdominal pain, weakness or numbness in his upper or lower extremities.    Limitations to History: none  Additional History Obtained from: patient alone    PMHx/PSHx:  Per HPI.   - has no past medical history on file.  - has no past surgical history on file.    Social History:  - Tobacco:  reports that he has never smoked. He has never used smokeless tobacco.   - Alcohol:  reports that he does not currently use alcohol.   - Drugs:  reports that he does not currently use drugs.     Medications: Reviewed in EMR.     Allergies:  Patient has no known allergies.    ???????????????????????????????????????????????????????????????  Triage Vitals:  T 36.6 °C (97.9 °F)  HR 97  /80  RR 18  O2 99 %      Physical Exam  Constitutional:       Appearance: Normal appearance.   HENT:      Head: Normocephalic and atraumatic.      Nose: No congestion or rhinorrhea.      Mouth/Throat:      Pharynx: Oropharynx is clear.   Eyes:      General: No scleral icterus.     Extraocular Movements: Extraocular  movements intact.      Conjunctiva/sclera: Conjunctivae normal.      Pupils: Pupils are equal, round, and reactive to light.   Neck:      Vascular: No carotid bruit.   Cardiovascular:      Rate and Rhythm: Normal rate and regular rhythm.      Pulses: Normal pulses.      Heart sounds: Normal heart sounds. No murmur heard.     No friction rub. No gallop.   Pulmonary:      Effort: Pulmonary effort is normal. No respiratory distress.      Breath sounds: Normal breath sounds. No wheezing, rhonchi or rales.   Abdominal:      General: Abdomen is flat. There is no distension.      Palpations: Abdomen is soft.      Tenderness: There is no abdominal tenderness. There is no guarding.   Musculoskeletal:         General: No swelling or tenderness. Normal range of motion.      Cervical back: Normal range of motion and neck supple.   Skin:     General: Skin is warm and dry.      Capillary Refill: Capillary refill takes less than 2 seconds.   Neurological:      General: No focal deficit present.      Mental Status: He is alert and oriented to person, place, and time.   Psychiatric:      Comments: Endorses passive HI without a plan.  No SI, auditory or visual hallucinations.       ???????????????????????????????????????????????????????????????  EKG (per my interpretation):  Sinus rhythm with a rate of 70.  No CO or QRS prolongation.  No acute ST or T wave changes noted.  QTc 390 without any evidence of MELISSA.  Normal axis.    ED Course  Diagnoses as of 03/25/24 0140   Homeless   History of homicidal ideation       Medical Decision Making:  Patient is a 34-year-old male with past medical history of schizophrenia presenting due to right ankle pain and passive homicidal ideation.  Patient with patient's history and physical examination psychiatric workup was initiated.  Patient is not significantly tender over his lateral malleolus and is ambulatory without any antalgic gait.  He has good capillary refill.  It does not appear to be  infected at this time.  If patient's lab work is normal, he can be medically cleared for psychiatric evaluation.  He was given 4 mg of his home dose of Risperdal to help with his homicidal ideation symptoms. Patient does have active prescription of his Risperdal but reports that he has not been taking it. Patient care was handed off to oncoming ED provider pending EPAT evaluation.     External records reviewed: recent inpatient, clinic, and prior ED notes  Diagnostic imaging independently reviewed/interpreted by me (as reflected in MDM) includes: None  Social Determinants Affecting Care: Housing insecurity and Mental health issues  Discussion of management with other providers: Attending, EPAT  Prescription Drug Consideration: None  Escalation of Care: Pending EPAT eval    Impression:   Right ankle pain  Homelessness  Schizophrenia  Passive homicidal ideation    Disposition: Handoff      Procedures ? SmartLinks last updated 3/24/2024 3:53 AM     Sheyla Kramer  PGY-2 Emergency Medicine  [facility]        ATTENDING ATTESTATION  The patient was seen by the resident.  I have personally performed a substantive portion of the encounter.  I have seen and examined the patient; agree with the workup, evaluation, MDM, management and diagnosis.  The care plan has been discussed with the resident; I have reviewed the resident’s note and agree with the documented findings.     I have reviewed the EKG and agree with the interpretation.    Cassie Bojorquez MD  Emergency Medicine         Sheyla Kramer MD  Resident  03/25/24 0420

## 2024-03-24 NOTE — DISCHARGE INSTRUCTIONS
Follow up with your therapist and psychiatrist. I encourage you use the handouts given to you by social work

## 2024-03-25 LAB
ATRIAL RATE: 70 BPM
HOLD SPECIMEN: NORMAL
P AXIS: 69 DEGREES
P OFFSET: 203 MS
P ONSET: 152 MS
PR INTERVAL: 136 MS
Q ONSET: 220 MS
QRS COUNT: 12 BEATS
QRS DURATION: 88 MS
QT INTERVAL: 364 MS
QTC CALCULATION(BAZETT): 393 MS
QTC FREDERICIA: 383 MS
R AXIS: 81 DEGREES
T AXIS: 58 DEGREES
T OFFSET: 402 MS
VENTRICULAR RATE: 70 BPM

## 2024-03-26 ENCOUNTER — HOSPITAL ENCOUNTER (EMERGENCY)
Facility: HOSPITAL | Age: 34
Discharge: LEFT BEFORE DISCHARGE | End: 2024-03-26
Payer: MEDICAID

## 2024-03-26 ENCOUNTER — HOSPITAL ENCOUNTER (EMERGENCY)
Facility: HOSPITAL | Age: 34
Discharge: HOME | End: 2024-03-27
Payer: MEDICAID

## 2024-03-26 VITALS
SYSTOLIC BLOOD PRESSURE: 122 MMHG | TEMPERATURE: 97.5 F | HEART RATE: 90 BPM | OXYGEN SATURATION: 99 % | RESPIRATION RATE: 15 BRPM | DIASTOLIC BLOOD PRESSURE: 85 MMHG

## 2024-03-26 VITALS
DIASTOLIC BLOOD PRESSURE: 59 MMHG | HEIGHT: 65 IN | SYSTOLIC BLOOD PRESSURE: 105 MMHG | BODY MASS INDEX: 26.66 KG/M2 | RESPIRATION RATE: 16 BRPM | TEMPERATURE: 96.4 F | HEART RATE: 77 BPM | OXYGEN SATURATION: 98 % | WEIGHT: 160 LBS

## 2024-03-26 DIAGNOSIS — S90.821A BLISTER (NONTHERMAL), RIGHT FOOT, INITIAL ENCOUNTER: ICD-10-CM

## 2024-03-26 DIAGNOSIS — M79.671 BILATERAL FOOT PAIN: Primary | ICD-10-CM

## 2024-03-26 DIAGNOSIS — M79.672 BILATERAL FOOT PAIN: Primary | ICD-10-CM

## 2024-03-26 PROCEDURE — 99283 EMERGENCY DEPT VISIT LOW MDM: CPT | Performed by: PHYSICIAN ASSISTANT

## 2024-03-26 PROCEDURE — 99281 EMR DPT VST MAYX REQ PHY/QHP: CPT

## 2024-03-26 PROCEDURE — 4500999001 HC ED NO CHARGE

## 2024-03-26 ASSESSMENT — COLUMBIA-SUICIDE SEVERITY RATING SCALE - C-SSRS
2. HAVE YOU ACTUALLY HAD ANY THOUGHTS OF KILLING YOURSELF?: NO
6. HAVE YOU EVER DONE ANYTHING, STARTED TO DO ANYTHING, OR PREPARED TO DO ANYTHING TO END YOUR LIFE?: NO
2. HAVE YOU ACTUALLY HAD ANY THOUGHTS OF KILLING YOURSELF?: NO
6. HAVE YOU EVER DONE ANYTHING, STARTED TO DO ANYTHING, OR PREPARED TO DO ANYTHING TO END YOUR LIFE?: NO
1. IN THE PAST MONTH, HAVE YOU WISHED YOU WERE DEAD OR WISHED YOU COULD GO TO SLEEP AND NOT WAKE UP?: NO
1. IN THE PAST MONTH, HAVE YOU WISHED YOU WERE DEAD OR WISHED YOU COULD GO TO SLEEP AND NOT WAKE UP?: NO

## 2024-03-26 ASSESSMENT — LIFESTYLE VARIABLES
TOTAL SCORE: 0
HAVE PEOPLE ANNOYED YOU BY CRITICIZING YOUR DRINKING: NO
EVER FELT BAD OR GUILTY ABOUT YOUR DRINKING: NO
EVER HAD A DRINK FIRST THING IN THE MORNING TO STEADY YOUR NERVES TO GET RID OF A HANGOVER: NO
HAVE YOU EVER FELT YOU SHOULD CUT DOWN ON YOUR DRINKING: NO

## 2024-03-26 ASSESSMENT — PAIN - FUNCTIONAL ASSESSMENT
PAIN_FUNCTIONAL_ASSESSMENT: 0-10
PAIN_FUNCTIONAL_ASSESSMENT: 0-10

## 2024-03-26 ASSESSMENT — PAIN DESCRIPTION - ORIENTATION: ORIENTATION: RIGHT;LEFT

## 2024-03-26 ASSESSMENT — PAIN DESCRIPTION - LOCATION
LOCATION: FOOT
LOCATION: FOOT

## 2024-03-26 ASSESSMENT — PAIN SCALES - GENERAL
PAINLEVEL_OUTOF10: 10 - WORST POSSIBLE PAIN
PAINLEVEL_OUTOF10: 10 - WORST POSSIBLE PAIN

## 2024-03-27 NOTE — ED TRIAGE NOTES
Pt states that he has blisters on his feet,  pt states they have been there for the past three days.

## 2024-03-27 NOTE — ED PROVIDER NOTES
HPI   Chief Complaint   Patient presents with    Foot Pain         Patient is a 34-year-old male with history of homelessness and schizophrenia who presents today for evaluation of bilateral foot pain, patient was seen 2 days ago for the same thing, diagnosed with tinea pedis of both feet, patient states he never picked up the medications but knows where to get them and plans to get them.  Patient states he has been on his feet and doing a lot of walking, states he thinks he has blisters on his feet.  Patient denies any falls injuries or traumas, denies any chance of a broken bone.  Denies any numbness tingling.  He states he is can be doing a lot of standing and walking tomorrow and so he is trying to get his feet taken care of.  He is also requesting a more blanket and a meal.                          Darlene Coma Scale Score: 15                     Patient History   Past Medical History:   Diagnosis Date    Schizophrenia (CMS/[facility])      History reviewed. No pertinent surgical history.  No family history on file.  Social History     Tobacco Use    Smoking status: Never    Smokeless tobacco: Never   Substance Use Topics    Alcohol use: Not Currently    Drug use: Not Currently       Physical Exam   ED Triage Vitals [03/26/24 2304]   Temperature Heart Rate Respirations BP   35.8 °C (96.4 °F) 77 16 105/59      Pulse Ox Temp Source Heart Rate Source Patient Position   98 % Temporal Monitor --      BP Location FiO2 (%)     -- 21 %       Physical Exam  Vitals and nursing note reviewed.   Constitutional:       General: He is not in acute distress.     Appearance: Normal appearance. He is not toxic-appearing.   HENT:      Head: Normocephalic and atraumatic.      Nose: Nose normal.   Eyes:      Extraocular Movements: Extraocular movements intact.   Cardiovascular:      Rate and Rhythm: Normal rate and regular rhythm.   Pulmonary:      Effort: Pulmonary effort is normal.   Abdominal:      Palpations: Abdomen is soft.    Musculoskeletal:         General: Normal range of motion.      Cervical back: Normal range of motion and neck supple.        Feet:    Feet:      Comments:     2+ PT DP pulses, no obvious bruising swelling or deformity to both feet, patient does have small blisters to the underside of the right distal foot, no breaks in the skin or signs of infection.  He is distally neurovascularly intact, flaky skin is noted in between the toes consistent with tinea pedis.  Normal cap refill and sensation to all toes.  No bony tenderness.    Skin:     General: Skin is warm and dry.   Neurological:      General: No focal deficit present.      Mental Status: He is alert.   Psychiatric:         Mood and Affect: Mood normal.         Thought Content: Thought content normal.         ED Course & MDM   Diagnoses as of 03/27/24 0015   Bilateral foot pain   Blister (nonthermal), right foot, initial encounter       Medical Decision Making    MDM: Patient is a 34-year-old male who presents today for evaluation of bilateral foot pain, he does have some blisters to the underside of the right distal foot, is neurovascularly intact with normal pulses.  Do not feel that there is any indication for imaging and patient agrees.  I did provide him with some wipes to wipe his feet, I then padded the underside of his feet with 2 x 2's and wrapped with gauze.  Patient was provided with fresh socks, a turkey sandwich and a warm blanket.  He is stable for discharge at this time.  He has no other complaints.        Procedure  Procedures     Janina Bruno PA-C  03/27/24 0015

## 2024-04-04 ENCOUNTER — HOSPITAL ENCOUNTER (EMERGENCY)
Facility: HOSPITAL | Age: 34
Discharge: HOME | End: 2024-04-04
Payer: MEDICAID

## 2024-04-04 VITALS
TEMPERATURE: 98 F | HEART RATE: 83 BPM | WEIGHT: 160 LBS | SYSTOLIC BLOOD PRESSURE: 96 MMHG | RESPIRATION RATE: 16 BRPM | DIASTOLIC BLOOD PRESSURE: 58 MMHG | HEIGHT: 66 IN | OXYGEN SATURATION: 96 % | BODY MASS INDEX: 25.71 KG/M2

## 2024-04-04 DIAGNOSIS — B35.3 TINEA PEDIS OF BOTH FEET: Primary | ICD-10-CM

## 2024-04-04 PROCEDURE — 99283 EMERGENCY DEPT VISIT LOW MDM: CPT | Performed by: PHYSICIAN ASSISTANT

## 2024-04-04 PROCEDURE — 99282 EMERGENCY DEPT VISIT SF MDM: CPT

## 2024-04-04 RX ORDER — ACETAMINOPHEN 325 MG/1
975 TABLET ORAL ONCE
Status: COMPLETED | OUTPATIENT
Start: 2024-04-04 | End: 2024-04-04

## 2024-04-04 RX ORDER — PRENATAL VIT 91/IRON/FOLIC/DHA 28-975-200
COMBINATION PACKAGE (EA) ORAL 2 TIMES DAILY
Qty: 15 G | Refills: 0 | Status: SHIPPED | OUTPATIENT
Start: 2024-04-04 | End: 2024-04-18

## 2024-04-04 RX ADMIN — ACETAMINOPHEN 975 MG: 325 TABLET ORAL at 01:53

## 2024-04-04 ASSESSMENT — LIFESTYLE VARIABLES
HAVE YOU EVER FELT YOU SHOULD CUT DOWN ON YOUR DRINKING: NO
EVER FELT BAD OR GUILTY ABOUT YOUR DRINKING: NO
HAVE PEOPLE ANNOYED YOU BY CRITICIZING YOUR DRINKING: NO
TOTAL SCORE: 0
EVER HAD A DRINK FIRST THING IN THE MORNING TO STEADY YOUR NERVES TO GET RID OF A HANGOVER: NO

## 2024-04-04 ASSESSMENT — PAIN - FUNCTIONAL ASSESSMENT: PAIN_FUNCTIONAL_ASSESSMENT: 0-10

## 2024-04-04 ASSESSMENT — PAIN DESCRIPTION - LOCATION: LOCATION: FOOT

## 2024-04-04 ASSESSMENT — PAIN SCALES - GENERAL
PAINLEVEL_OUTOF10: 0 - NO PAIN
PAINLEVEL_OUTOF10: 4

## 2024-04-04 ASSESSMENT — PAIN DESCRIPTION - ORIENTATION: ORIENTATION: LEFT;RIGHT

## 2024-04-04 NOTE — ED PROVIDER NOTES
"This is a 34-year-old male with a past medical history of homelessness and schizophrenia with recently diagnosed athlete's foot infection last month who presents to the ED with concern for return of his athlete's foot infection.  He states that for the past 2 to 3 days he has had a burning sensation in his feet, worse around his toes and has noticed cracked peeling skin.  Denies any new injury or trauma.  He denies any recent falls.  He has been able to ambulate without difficulty.  He denies any fevers or chills.  He states otherwise he has been in his normal state of health and denies any complaints.      History provided by:  Patient  History limited by:  Psychiatric disorder   used: No             Visit Vitals  BP 96/58   Pulse 83   Temp 36.7 °C (98 °F) (Oral)   Resp 16   Ht 1.676 m (5' 6\")   Wt 72.6 kg (160 lb)   SpO2 96%   BMI 25.82 kg/m²   Smoking Status Never   BSA 1.84 m²          Physical Exam     Physical exam:   General: Vitals noted, no distress. Afebrile.   EENT:  Hearing grossly intact. Normal phonation. MMM. Airway patient. PERRL. EOMI.   Neck: No midline tenderness or paraspinal tenderness. FROM.   Cardiac: Regular, rate, rhythm. Normal S1 and S2.  No murmurs, gallops, rubs.   Pulmonary: Good air exchange. Lungs clear bilaterally. No wheezes, rhonchi, rales. No accessory muscle use.   Extremities: No peripheral edema.  Full range of motion. Moves all extremities freely. No tenderness throughout extremities.  In between the toes of the feet bilaterally with the right greater than the left there is a dry, cracked, peeling skin without any large wounds or ulcerations.  No surrounding erythema or excess warmth.  DP and PT pulses full and equal bilaterally.  No bony tenderness to the feet bilaterally.  Neuro: No focal neurologic deficits. CN 2-12 grossly intact. Sensation equal bilaterally. No weakness.         Labs Reviewed - No data to display    No orders to display         ED " Course & MDM     Medical Decision Making  This is a 34-year-old male with past medical history of homelessness and schizophrenia who presents to the ED with concern for return of his athlete's foot infection.  Vital stable upon arrival to the ED.  On examination patient had no reproducible tenderness to his feet bilaterally.  DP and PT pulses full and equal.  Feet were normothermic.  No ulcerations or large wounds to the feet bilaterally.  No excess warmth or erythema to the feet bilaterally.  In between the patient's toes there was dry, cracked, peeling skin consistent with athlete's foot.  This appeared to be worse on the patient's right foot compared to the left foot.  As the patient had no reproducible bony tenderness and had no injury or trauma emergent x-rays not indicated at this point in time.  He was given a dose of Tylenol here in the ED for his pain and was written a prescription for terbinafine cream for his feet bilaterally.  He was advised to follow the primary care provider.  He was given a fresh pair of socks and was given signs and symptoms that he should return to the ED with.  He had no further questions at time of discharge.    Risk  Prescription drug management.  Diagnosis or treatment significantly limited by social determinants of health.         Diagnoses as of 04/04/24 0149   Tinea pedis of both feet       Procedures    ROM Chaudhry, PAPhiC     Radha Garcia PA-C  04/04/24 0154

## 2024-08-16 ENCOUNTER — APPOINTMENT (OUTPATIENT)
Dept: RADIOLOGY | Facility: HOSPITAL | Age: 34
End: 2024-08-16
Payer: MEDICAID

## 2024-08-16 ENCOUNTER — HOSPITAL ENCOUNTER (EMERGENCY)
Facility: HOSPITAL | Age: 34
Discharge: HOME | End: 2024-08-16
Attending: STUDENT IN AN ORGANIZED HEALTH CARE EDUCATION/TRAINING PROGRAM
Payer: MEDICAID

## 2024-08-16 VITALS
WEIGHT: 160 LBS | BODY MASS INDEX: 26.66 KG/M2 | SYSTOLIC BLOOD PRESSURE: 119 MMHG | HEART RATE: 83 BPM | HEIGHT: 65 IN | OXYGEN SATURATION: 98 % | DIASTOLIC BLOOD PRESSURE: 70 MMHG | TEMPERATURE: 96.8 F | RESPIRATION RATE: 18 BRPM

## 2024-08-16 DIAGNOSIS — M25.561 ACUTE PAIN OF RIGHT KNEE: Primary | ICD-10-CM

## 2024-08-16 PROCEDURE — 73700 CT LOWER EXTREMITY W/O DYE: CPT | Mod: RIGHT SIDE | Performed by: RADIOLOGY

## 2024-08-16 PROCEDURE — 73700 CT LOWER EXTREMITY W/O DYE: CPT | Mod: RT

## 2024-08-16 PROCEDURE — 99284 EMERGENCY DEPT VISIT MOD MDM: CPT | Mod: 25

## 2024-08-16 PROCEDURE — 2500000001 HC RX 250 WO HCPCS SELF ADMINISTERED DRUGS (ALT 637 FOR MEDICARE OP): Mod: SE | Performed by: STUDENT IN AN ORGANIZED HEALTH CARE EDUCATION/TRAINING PROGRAM

## 2024-08-16 PROCEDURE — 73562 X-RAY EXAM OF KNEE 3: CPT | Mod: RIGHT SIDE | Performed by: STUDENT IN AN ORGANIZED HEALTH CARE EDUCATION/TRAINING PROGRAM

## 2024-08-16 PROCEDURE — 73562 X-RAY EXAM OF KNEE 3: CPT | Mod: RT

## 2024-08-16 RX ORDER — ACETAMINOPHEN 500 MG
500 TABLET ORAL EVERY 6 HOURS PRN
Qty: 28 TABLET | Refills: 0 | Status: SHIPPED | OUTPATIENT
Start: 2024-08-16 | End: 2024-08-23

## 2024-08-16 RX ORDER — IBUPROFEN 600 MG/1
600 TABLET ORAL EVERY 6 HOURS PRN
Qty: 28 TABLET | Refills: 0 | Status: SHIPPED | OUTPATIENT
Start: 2024-08-16 | End: 2024-08-23

## 2024-08-16 RX ORDER — IBUPROFEN 600 MG/1
600 TABLET ORAL ONCE
Status: COMPLETED | OUTPATIENT
Start: 2024-08-16 | End: 2024-08-16

## 2024-08-16 ASSESSMENT — PAIN DESCRIPTION - PAIN TYPE: TYPE: ACUTE PAIN

## 2024-08-16 ASSESSMENT — PAIN - FUNCTIONAL ASSESSMENT: PAIN_FUNCTIONAL_ASSESSMENT: 0-10

## 2024-08-16 ASSESSMENT — PAIN DESCRIPTION - ORIENTATION: ORIENTATION: RIGHT

## 2024-08-16 ASSESSMENT — PAIN DESCRIPTION - LOCATION: LOCATION: KNEE

## 2024-08-16 ASSESSMENT — PAIN SCALES - GENERAL: PAINLEVEL_OUTOF10: 7

## 2024-08-16 NOTE — PROGRESS NOTES
Patient was handed off to me from the previous team. For full history, physical, and prior ED course, please see previous provider note prior to patient handoff. This is an addendum to the record.  Sign out received from Jeff Aguilar MD at 07:00    Misael Rhodes   presented to Emergency Department  for   Chief Complaint   Patient presents with    Knee Injury     Medical work up included labs, diagnostic testing.   Imaging:  CT knee right wo IV contrast   Final Result   Mild degenerative changes of the right knee. No evidence of fracture.             MACRO:   None        Signed by: Maicol Larkin 8/16/2024 11:08 AM   Dictation workstation:   [id number]      XR knee right 3 views   Final Result   1. Slight depression of the medial tibial plateau suggestive of acute   fracture. More definitive evaluation can be considered with dedicated   CT imaging.   2. Small volume knee joint effusion.        I personally reviewed the image(s)/study and resident interpretation.   I agree with the findings as stated by resident Deon Diego.   Data analyzed and images interpreted at [facility], [place], [place].        MACRO:   Deon Diego discussed the significance and urgency of this   critical finding by telephone with  JEFF AGUILAR on 8/16/2024 at 7:09   am.  (**-RCF-**) Findings:  See findings.        Signed by: Amelia Hernández 8/16/2024 9:12 AM   Dictation workstation:   [id number]      Point of Care Ultrasound    (Results Pending)         At time of sign out pending: Final Xray results.       What occured after transition of care: Xray of the right knee showed a slight depression of the medial tibial plateau suggestive of acute fracture. Ordered CT of the knee for more definitive evaluation. CT results showed mild degenerative changes, no evidence of fracture.  Prescribed Tylenol and Motrin for pain.  Counseled patient to follow-up with the  Ortho clinic, provided the contact  information.  Discussed ED return criteria.  Patient verbalized understanding was agreeable to plan of care.  Discharged in stable condition.         Jamila Montgomery PA-C

## 2024-08-16 NOTE — ED PROVIDER NOTES
HPI   Chief Complaint   Patient presents with    Knee Injury       Patient is a 34-year-old male with no reported past medical history presenting with concern for knee pain.  Endorses no obvious provocation but does endorse he was lifting weights including squats today.  Endorses afterwards he had significant pain on the area overlying his patellar tendon on the right knee.  Endorses no falls, dropping anything on it or traumatic injury.  Endorses he has been able to ambulate since            Patient History   Past Medical History:   Diagnosis Date    Schizophrenia (Multi)      No past surgical history on file.  No family history on file.  Social History     Tobacco Use    Smoking status: Never    Smokeless tobacco: Never   Substance Use Topics    Alcohol use: Not Currently    Drug use: Not Currently       Physical Exam   ED Triage Vitals [08/16/24 0309]   Temperature Heart Rate Respirations BP   36 °C (96.8 °F) 94 18 107/69      Pulse Ox Temp Source Heart Rate Source Patient Position   99 % Temporal -- --      BP Location FiO2 (%)     -- --       Physical Exam  Constitutional:       Appearance: Normal appearance.   HENT:      Head: Normocephalic and atraumatic.   Eyes:      Extraocular Movements: Extraocular movements intact.   Cardiovascular:      Rate and Rhythm: Normal rate.   Pulmonary:      Effort: Pulmonary effort is normal.   Musculoskeletal:         General: Normal range of motion.      Cervical back: Normal range of motion.      Comments: No laxity on anterior drawer, posterior drawer or valgus or varus strain.  No palpable swelling.  Tenderness over anterior patellar tendon but no bony tenderness in knee otherwise.   Skin:     General: Skin is warm and dry.   Neurological:      General: No focal deficit present.      Mental Status: He is alert and oriented to person, place, and time.   Psychiatric:         Mood and Affect: Mood normal.         Behavior: Behavior normal.           ED Course & MDM   ED  Course as of 08/17/24 1500   Fri Aug 16, 2024   0551 34-year-old male, sitting in a chair, no acute distress.  Presenting for right knee injury.  Occurred while lifting weights, squatting.  He had pain over his knee.  Denies any traumatic injury.  No swelling.  Has not tried anything for symptoms.  On exam he has full range of motion of the right lower extremity, tenderness to palpation over the patellar tendon noted, no laxity of the joints, no effusion.  Ultrasound does not demonstrate any evidence of a patellar tear and the patient's examination is also not consistent with this.  X-ray will be obtained, we will provide the patient with analgesia and likely follow-up to sports medicine for likely patellar tendon strain [NS]      ED Course User Index  [NS] Rajendra Lopez MD         Diagnoses as of 08/17/24 1500   Acute pain of right knee                 No data recorded                                 Medical Decision Making  Patient is a 34-year-old male with no reported past medical history presenting with concern for knee pain.  Mechanism during lifting and location of pain more concerning for muscle strain or tendon/ligamentous injury.  Evaluated with ultrasound with no evidence of hematoma or tear in patellar tendon.  Knee otherwise with no laxity, negative anterior and posterior drawer test and no significant effusion reducing concern for complete ligament tear.  Screening x-ray additionally obtained.  ED provider interpretation of x-ray with no evidence of acute fracture.  Treated in ED with ibuprofen.  Patient handed off pending radiology reads of x-ray.    Patient seen and discussed with Dr. John Aguilar MD, PhD  Emergency Medicine PGY3          Procedure  Procedures     Jeff Aguilar MD  Resident  08/17/24 1898

## 2024-08-22 ENCOUNTER — HOSPITAL ENCOUNTER (EMERGENCY)
Facility: HOSPITAL | Age: 34
Discharge: HOME | End: 2024-08-22
Payer: MEDICAID

## 2024-08-22 VITALS
TEMPERATURE: 97.5 F | BODY MASS INDEX: 25.71 KG/M2 | RESPIRATION RATE: 18 BRPM | DIASTOLIC BLOOD PRESSURE: 65 MMHG | WEIGHT: 160 LBS | OXYGEN SATURATION: 100 % | HEART RATE: 66 BPM | SYSTOLIC BLOOD PRESSURE: 106 MMHG | HEIGHT: 66 IN

## 2024-08-22 DIAGNOSIS — K85.90 ACUTE PANCREATITIS, UNSPECIFIED COMPLICATION STATUS, UNSPECIFIED PANCREATITIS TYPE (HHS-HCC): Primary | ICD-10-CM

## 2024-08-22 LAB
ALBUMIN SERPL BCP-MCNC: 3.7 G/DL (ref 3.4–5)
ALP SERPL-CCNC: 59 U/L (ref 33–120)
ALT SERPL W P-5'-P-CCNC: 15 U/L (ref 10–52)
ANION GAP SERPL CALC-SCNC: 11 MMOL/L (ref 10–20)
APPEARANCE UR: CLEAR
AST SERPL W P-5'-P-CCNC: 18 U/L (ref 9–39)
BASOPHILS # BLD AUTO: 0.04 X10*3/UL (ref 0–0.1)
BASOPHILS NFR BLD AUTO: 0.5 %
BILIRUB SERPL-MCNC: 0.3 MG/DL (ref 0–1.2)
BILIRUB UR STRIP.AUTO-MCNC: NEGATIVE MG/DL
BUN SERPL-MCNC: 13 MG/DL (ref 6–23)
CALCIUM SERPL-MCNC: 8.7 MG/DL (ref 8.6–10.6)
CHLORIDE SERPL-SCNC: 104 MMOL/L (ref 98–107)
CO2 SERPL-SCNC: 29 MMOL/L (ref 21–32)
COLOR UR: ABNORMAL
CREAT SERPL-MCNC: 1.15 MG/DL (ref 0.5–1.3)
EGFRCR SERPLBLD CKD-EPI 2021: 86 ML/MIN/1.73M*2
EOSINOPHIL # BLD AUTO: 0.12 X10*3/UL (ref 0–0.7)
EOSINOPHIL NFR BLD AUTO: 1.6 %
ERYTHROCYTE [DISTWIDTH] IN BLOOD BY AUTOMATED COUNT: 13.4 % (ref 11.5–14.5)
GLUCOSE SERPL-MCNC: 93 MG/DL (ref 74–99)
GLUCOSE UR STRIP.AUTO-MCNC: NORMAL MG/DL
HCT VFR BLD AUTO: 43.3 % (ref 41–52)
HGB BLD-MCNC: 14.7 G/DL (ref 13.5–17.5)
HOLD SPECIMEN: NORMAL
IMM GRANULOCYTES # BLD AUTO: 0.05 X10*3/UL (ref 0–0.7)
IMM GRANULOCYTES NFR BLD AUTO: 0.7 % (ref 0–0.9)
KETONES UR STRIP.AUTO-MCNC: ABNORMAL MG/DL
LEUKOCYTE ESTERASE UR QL STRIP.AUTO: NEGATIVE
LIPASE SERPL-CCNC: 242 U/L (ref 9–82)
LYMPHOCYTES # BLD AUTO: 3.03 X10*3/UL (ref 1.2–4.8)
LYMPHOCYTES NFR BLD AUTO: 40 %
MAGNESIUM SERPL-MCNC: 2.05 MG/DL (ref 1.6–2.4)
MCH RBC QN AUTO: 30.6 PG (ref 26–34)
MCHC RBC AUTO-ENTMCNC: 33.9 G/DL (ref 32–36)
MCV RBC AUTO: 90 FL (ref 80–100)
MONOCYTES # BLD AUTO: 0.57 X10*3/UL (ref 0.1–1)
MONOCYTES NFR BLD AUTO: 7.5 %
NEUTROPHILS # BLD AUTO: 3.77 X10*3/UL (ref 1.2–7.7)
NEUTROPHILS NFR BLD AUTO: 49.7 %
NITRITE UR QL STRIP.AUTO: NEGATIVE
NRBC BLD-RTO: 0 /100 WBCS (ref 0–0)
PH UR STRIP.AUTO: 5.5 [PH]
PLATELET # BLD AUTO: 265 X10*3/UL (ref 150–450)
POTASSIUM SERPL-SCNC: 3.8 MMOL/L (ref 3.5–5.3)
PROT SERPL-MCNC: 6.2 G/DL (ref 6.4–8.2)
PROT UR STRIP.AUTO-MCNC: NEGATIVE MG/DL
RBC # BLD AUTO: 4.8 X10*6/UL (ref 4.5–5.9)
RBC # UR STRIP.AUTO: NEGATIVE /UL
SODIUM SERPL-SCNC: 140 MMOL/L (ref 136–145)
SP GR UR STRIP.AUTO: 1.02
UROBILINOGEN UR STRIP.AUTO-MCNC: NORMAL MG/DL
WBC # BLD AUTO: 7.6 X10*3/UL (ref 4.4–11.3)

## 2024-08-22 PROCEDURE — 99284 EMERGENCY DEPT VISIT MOD MDM: CPT

## 2024-08-22 PROCEDURE — 83735 ASSAY OF MAGNESIUM: CPT

## 2024-08-22 PROCEDURE — 2500000005 HC RX 250 GENERAL PHARMACY W/O HCPCS: Mod: SE

## 2024-08-22 PROCEDURE — 99283 EMERGENCY DEPT VISIT LOW MDM: CPT

## 2024-08-22 PROCEDURE — 83690 ASSAY OF LIPASE: CPT

## 2024-08-22 PROCEDURE — 36415 COLL VENOUS BLD VENIPUNCTURE: CPT

## 2024-08-22 PROCEDURE — 80053 COMPREHEN METABOLIC PANEL: CPT

## 2024-08-22 PROCEDURE — 81003 URINALYSIS AUTO W/O SCOPE: CPT

## 2024-08-22 PROCEDURE — 85025 COMPLETE CBC W/AUTO DIFF WBC: CPT

## 2024-08-22 PROCEDURE — 2500000001 HC RX 250 WO HCPCS SELF ADMINISTERED DRUGS (ALT 637 FOR MEDICARE OP): Mod: SE

## 2024-08-22 RX ORDER — FAMOTIDINE 40 MG/1
40 TABLET, FILM COATED ORAL NIGHTLY PRN
Qty: 10 TABLET | Refills: 0 | Status: SHIPPED | OUTPATIENT
Start: 2024-08-22 | End: 2024-09-01

## 2024-08-22 RX ORDER — MORPHINE SULFATE 4 MG/ML
4 INJECTION INTRAVENOUS ONCE
Status: DISCONTINUED | OUTPATIENT
Start: 2024-08-22 | End: 2024-08-22

## 2024-08-22 RX ORDER — FAMOTIDINE 10 MG/ML
20 INJECTION INTRAVENOUS ONCE
Status: DISCONTINUED | OUTPATIENT
Start: 2024-08-22 | End: 2024-08-22

## 2024-08-22 RX ORDER — OXYCODONE HYDROCHLORIDE 5 MG/1
5 TABLET ORAL ONCE
Status: COMPLETED | OUTPATIENT
Start: 2024-08-22 | End: 2024-08-22

## 2024-08-22 RX ORDER — FAMOTIDINE 40 MG/1
40 TABLET, FILM COATED ORAL ONCE
Status: COMPLETED | OUTPATIENT
Start: 2024-08-22 | End: 2024-08-22

## 2024-08-22 RX ORDER — ACETAMINOPHEN 325 MG/1
650 TABLET ORAL EVERY 6 HOURS PRN
Qty: 20 TABLET | Refills: 0 | Status: SHIPPED | OUTPATIENT
Start: 2024-08-22 | End: 2024-08-27

## 2024-08-22 RX ORDER — OXYCODONE HYDROCHLORIDE 5 MG/1
5 TABLET ORAL EVERY 6 HOURS PRN
Qty: 4 TABLET | Refills: 0 | Status: SHIPPED | OUTPATIENT
Start: 2024-08-22 | End: 2024-08-24

## 2024-08-22 RX ORDER — ACETAMINOPHEN 325 MG/1
975 TABLET ORAL ONCE
Status: COMPLETED | OUTPATIENT
Start: 2024-08-22 | End: 2024-08-22

## 2024-08-22 RX ADMIN — OXYCODONE HYDROCHLORIDE 5 MG: 5 TABLET ORAL at 11:23

## 2024-08-22 RX ADMIN — ACETAMINOPHEN 975 MG: 325 TABLET ORAL at 08:33

## 2024-08-22 RX ADMIN — FAMOTIDINE 40 MG: 40 TABLET, FILM COATED ORAL at 08:33

## 2024-08-22 RX ADMIN — DIPHENHYDRAMINE HYDROCHLORIDE AND LIDOCAINE HYDROCHLORIDE AND ALUMINUM HYDROXIDE AND MAGNESIUM HYDRO 10 ML: KIT at 07:50

## 2024-08-22 ASSESSMENT — PAIN SCALES - GENERAL
PAINLEVEL_OUTOF10: 8
PAINLEVEL_OUTOF10: 9

## 2024-08-22 ASSESSMENT — PAIN SCALES - WONG BAKER: WONGBAKER_NUMERICALRESPONSE: HURTS WHOLE LOT

## 2024-08-22 ASSESSMENT — PAIN DESCRIPTION - LOCATION: LOCATION: ABDOMEN

## 2024-08-22 ASSESSMENT — PAIN - FUNCTIONAL ASSESSMENT: PAIN_FUNCTIONAL_ASSESSMENT: 0-10

## 2024-08-22 NOTE — ED PROVIDER NOTES
HPI   Chief Complaint   Patient presents with   • Abdominal Pain       34-year-old male with history of schizophrenia presents for chief complaint of abdominal pain.  Generalized abdominal pain that started yesterday around 4 PM.  Denies injury.  Intermittent.  States that it encompasses his entire abdomen.  Denies any alcohol or drug use.  Denies any changes in diet.  No nausea or vomiting.  No changes in urination or bowel movement.  No fevers or chills or myalgia.              Patient History   Past Medical History:   Diagnosis Date   • Schizophrenia (Multi)      No past surgical history on file.  No family history on file.  Social History     Tobacco Use   • Smoking status: Never   • Smokeless tobacco: Never   Substance Use Topics   • Alcohol use: Not Currently   • Drug use: Not Currently       Physical Exam   ED Triage Vitals [08/22/24 0528]   Temperature Heart Rate Respirations BP   36.4 °C (97.5 °F) 66 18 106/65      Pulse Ox Temp Source Heart Rate Source Patient Position   100 % Tympanic -- --      BP Location FiO2 (%)     -- --       Physical Exam  Vitals and nursing note reviewed.   Constitutional:       General: He is not in acute distress.     Appearance: He is well-developed.   HENT:      Head: Normocephalic and atraumatic.   Eyes:      Conjunctiva/sclera: Conjunctivae normal.   Cardiovascular:      Rate and Rhythm: Normal rate and regular rhythm.      Heart sounds: No murmur heard.  Pulmonary:      Effort: Pulmonary effort is normal. No respiratory distress.      Breath sounds: Normal breath sounds.   Abdominal:      Palpations: Abdomen is soft.      Tenderness: There is no abdominal tenderness. There is no right CVA tenderness, guarding or rebound. Negative signs include Hogue's sign and McBurney's sign.   Musculoskeletal:         General: No swelling.      Cervical back: Neck supple.   Skin:     General: Skin is warm and dry.      Capillary Refill: Capillary refill takes less than 2 seconds.    Neurological:      Mental Status: He is alert.   Psychiatric:         Mood and Affect: Mood normal.           ED Course & MDM   Diagnoses as of 08/22/24 1158   Acute pancreatitis, unspecified complication status, unspecified pancreatitis type ([facility])                 No data recorded                                 Medical Decision Making  Epigastric tenderness on palpation.  Vital signs reviewed, unremarkable at this time.  Patient overall is well-appearing and in no apparent distress.  Speaks full sentences without difficulty.  Diagnostic testing performed.  Tylenol, Pepcid, BMX given for symptom management.  Labs significant for elevated lipase at 242 concerning for possible pancreatitis.  Otherwise unremarkable.  No leukocytosis or leukopenia.  Glucose and electrolytes and kidney function and liver function all within normal ranges.  On reevaluation the patient endorses feeling overall improved and asked for some food and drink. Stated that the pain came back a bit but he was able to tolerate p.o.  Gave oxycodone and the pain subsided again.  Advised to eat healthy diet and drink water and avoid alcohol or drug use.  Advised to follow-up with GI and primary care.  Patient likely has pancreatitis but at this point is tolerating p.o. and does not appear to be in pain.  States that his pain has improved well.  I believe he can go home and manage this outpatient.  Advised to return with any new or worsening symptoms.  Encouraged to follow-up with soon as possible.  Patient agreed with this plan.  Discharged in stable condition.        Procedure  Procedures     Sharan Franco, BRY-RUSH  08/22/24 1159

## 2024-08-22 NOTE — ED TRIAGE NOTES
Pt to the ED for abdominal pain onset yesterday. No nausea, vomiting and diarrhea. No urinary symptoms. Pain is intermittent.

## 2024-08-23 ENCOUNTER — APPOINTMENT (OUTPATIENT)
Dept: RADIOLOGY | Facility: HOSPITAL | Age: 34
End: 2024-08-23
Payer: MEDICAID

## 2024-08-23 ENCOUNTER — HOSPITAL ENCOUNTER (EMERGENCY)
Facility: HOSPITAL | Age: 34
Discharge: HOME | End: 2024-08-23
Attending: STUDENT IN AN ORGANIZED HEALTH CARE EDUCATION/TRAINING PROGRAM
Payer: MEDICAID

## 2024-08-23 VITALS
TEMPERATURE: 97.9 F | HEART RATE: 66 BPM | SYSTOLIC BLOOD PRESSURE: 101 MMHG | RESPIRATION RATE: 16 BRPM | HEIGHT: 66 IN | DIASTOLIC BLOOD PRESSURE: 67 MMHG | WEIGHT: 160 LBS | BODY MASS INDEX: 25.71 KG/M2 | OXYGEN SATURATION: 99 %

## 2024-08-23 DIAGNOSIS — R91.8 PULMONARY NODULES: ICD-10-CM

## 2024-08-23 DIAGNOSIS — R10.13 EPIGASTRIC PAIN: Primary | ICD-10-CM

## 2024-08-23 LAB
ALBUMIN SERPL BCP-MCNC: 3.7 G/DL (ref 3.4–5)
ALP SERPL-CCNC: 50 U/L (ref 33–120)
ALT SERPL W P-5'-P-CCNC: 14 U/L (ref 10–52)
ANION GAP SERPL CALC-SCNC: 10 MMOL/L (ref 10–20)
AST SERPL W P-5'-P-CCNC: 17 U/L (ref 9–39)
BASOPHILS # BLD AUTO: 0.03 X10*3/UL (ref 0–0.1)
BASOPHILS NFR BLD AUTO: 0.4 %
BILIRUB SERPL-MCNC: 0.3 MG/DL (ref 0–1.2)
BUN SERPL-MCNC: 11 MG/DL (ref 6–23)
CALCIUM SERPL-MCNC: 8.8 MG/DL (ref 8.6–10.6)
CHLORIDE SERPL-SCNC: 107 MMOL/L (ref 98–107)
CO2 SERPL-SCNC: 27 MMOL/L (ref 21–32)
CREAT SERPL-MCNC: 1.31 MG/DL (ref 0.5–1.3)
EGFRCR SERPLBLD CKD-EPI 2021: 74 ML/MIN/1.73M*2
EOSINOPHIL # BLD AUTO: 0.11 X10*3/UL (ref 0–0.7)
EOSINOPHIL NFR BLD AUTO: 1.5 %
ERYTHROCYTE [DISTWIDTH] IN BLOOD BY AUTOMATED COUNT: 13.3 % (ref 11.5–14.5)
GLUCOSE SERPL-MCNC: 87 MG/DL (ref 74–99)
HCT VFR BLD AUTO: 41.1 % (ref 41–52)
HGB BLD-MCNC: 14.2 G/DL (ref 13.5–17.5)
HOLD SPECIMEN: NORMAL
IMM GRANULOCYTES # BLD AUTO: 0.04 X10*3/UL (ref 0–0.7)
IMM GRANULOCYTES NFR BLD AUTO: 0.5 % (ref 0–0.9)
LIPASE SERPL-CCNC: 25 U/L (ref 9–82)
LYMPHOCYTES # BLD AUTO: 3.29 X10*3/UL (ref 1.2–4.8)
LYMPHOCYTES NFR BLD AUTO: 44.1 %
MAGNESIUM SERPL-MCNC: 2.1 MG/DL (ref 1.6–2.4)
MCH RBC QN AUTO: 30.7 PG (ref 26–34)
MCHC RBC AUTO-ENTMCNC: 34.5 G/DL (ref 32–36)
MCV RBC AUTO: 89 FL (ref 80–100)
MONOCYTES # BLD AUTO: 0.51 X10*3/UL (ref 0.1–1)
MONOCYTES NFR BLD AUTO: 6.8 %
NEUTROPHILS # BLD AUTO: 3.48 X10*3/UL (ref 1.2–7.7)
NEUTROPHILS NFR BLD AUTO: 46.7 %
NRBC BLD-RTO: 0 /100 WBCS (ref 0–0)
PLATELET # BLD AUTO: 267 X10*3/UL (ref 150–450)
POTASSIUM SERPL-SCNC: 3.9 MMOL/L (ref 3.5–5.3)
PROT SERPL-MCNC: 5.9 G/DL (ref 6.4–8.2)
RBC # BLD AUTO: 4.62 X10*6/UL (ref 4.5–5.9)
SODIUM SERPL-SCNC: 140 MMOL/L (ref 136–145)
WBC # BLD AUTO: 7.5 X10*3/UL (ref 4.4–11.3)

## 2024-08-23 PROCEDURE — 74177 CT ABD & PELVIS W/CONTRAST: CPT

## 2024-08-23 PROCEDURE — 84075 ASSAY ALKALINE PHOSPHATASE: CPT

## 2024-08-23 PROCEDURE — 2550000001 HC RX 255 CONTRASTS: Mod: SE | Performed by: STUDENT IN AN ORGANIZED HEALTH CARE EDUCATION/TRAINING PROGRAM

## 2024-08-23 PROCEDURE — 2500000005 HC RX 250 GENERAL PHARMACY W/O HCPCS: Mod: SE

## 2024-08-23 PROCEDURE — 74177 CT ABD & PELVIS W/CONTRAST: CPT | Mod: FOREIGN READ | Performed by: RADIOLOGY

## 2024-08-23 PROCEDURE — 85025 COMPLETE CBC W/AUTO DIFF WBC: CPT

## 2024-08-23 PROCEDURE — 2500000004 HC RX 250 GENERAL PHARMACY W/ HCPCS (ALT 636 FOR OP/ED): Mod: SE

## 2024-08-23 PROCEDURE — 83735 ASSAY OF MAGNESIUM: CPT

## 2024-08-23 PROCEDURE — 99285 EMERGENCY DEPT VISIT HI MDM: CPT | Performed by: STUDENT IN AN ORGANIZED HEALTH CARE EDUCATION/TRAINING PROGRAM

## 2024-08-23 PROCEDURE — 99284 EMERGENCY DEPT VISIT MOD MDM: CPT | Mod: 25

## 2024-08-23 PROCEDURE — 96374 THER/PROPH/DIAG INJ IV PUSH: CPT

## 2024-08-23 PROCEDURE — 36415 COLL VENOUS BLD VENIPUNCTURE: CPT

## 2024-08-23 PROCEDURE — 83690 ASSAY OF LIPASE: CPT

## 2024-08-23 RX ORDER — ACETAMINOPHEN 325 MG/1
650 TABLET ORAL ONCE
Status: COMPLETED | OUTPATIENT
Start: 2024-08-23 | End: 2024-08-23

## 2024-08-23 RX ORDER — FAMOTIDINE 10 MG/ML
40 INJECTION INTRAVENOUS ONCE
Status: COMPLETED | OUTPATIENT
Start: 2024-08-23 | End: 2024-08-23

## 2024-08-23 ASSESSMENT — LIFESTYLE VARIABLES
EVER HAD A DRINK FIRST THING IN THE MORNING TO STEADY YOUR NERVES TO GET RID OF A HANGOVER: NO
EVER FELT BAD OR GUILTY ABOUT YOUR DRINKING: NO
TOTAL SCORE: 0
HAVE PEOPLE ANNOYED YOU BY CRITICIZING YOUR DRINKING: NO
HAVE YOU EVER FELT YOU SHOULD CUT DOWN ON YOUR DRINKING: NO

## 2024-08-23 ASSESSMENT — PAIN DESCRIPTION - LOCATION: LOCATION: ABDOMEN

## 2024-08-23 ASSESSMENT — PAIN DESCRIPTION - FREQUENCY: FREQUENCY: CONSTANT/CONTINUOUS

## 2024-08-23 ASSESSMENT — PAIN DESCRIPTION - DESCRIPTORS: DESCRIPTORS: ACHING

## 2024-08-23 ASSESSMENT — PAIN SCALES - GENERAL: PAINLEVEL_OUTOF10: 7

## 2024-08-23 ASSESSMENT — PAIN SCALES - WONG BAKER: WONGBAKER_NUMERICALRESPONSE: NO HURT

## 2024-08-23 ASSESSMENT — PAIN DESCRIPTION - PAIN TYPE: TYPE: ACUTE PAIN

## 2024-08-23 ASSESSMENT — PAIN - FUNCTIONAL ASSESSMENT: PAIN_FUNCTIONAL_ASSESSMENT: 0-10

## 2024-08-23 ASSESSMENT — PAIN DESCRIPTION - PROGRESSION: CLINICAL_PROGRESSION: NOT CHANGED

## 2024-08-23 NOTE — DISCHARGE INSTRUCTIONS
You have already been given a referral to GI. Please follow-up with this referral and schedule an appointment soon as possible. It is a pleasure to take care of you.  Of note, your CT scan shows incidental findings of small pulmonary nodules. Please follow-up with your primary care doctor about these findings.

## 2024-08-23 NOTE — ED PROVIDER NOTES
History of Present Illness   Information Gathering: History collected from patient and chart review    HPI:  Misael Rhodes is a 34 y.o. male with history of homelessness and schizophrenia presenting to the emergency department for abdominal pain. Patient was seen here in the emergency department less than 24 hours ago for identical complaint. At that time, patient given Tylenol, Pepcid, and BMX for symptom management with improvement. Labs collected at that time with findings significant for lipase of 242 likely reflecting degree of pancreatitis. Due to improved symptoms and ability to tolerate p.o. intake, patient was discharged home with referral for GI follow-up. Since discharge, patient states that he was feeling better until around 6 PM when abdominal pain returned. He states that he felt nauseous without emesis. Pain primarily located in epigastric region. No chest pain or shortness of breath. Due to return of symptoms and return precautions from previous team, patient return to the emergency department.    Physical Exam   Triage vitals:  T 36.6 °C (97.9 °F)  HR 66  /67  RR 16  O2 99 % None (Room air)    Physical Exam  Constitutional:       Appearance: Normal appearance.   HENT:      Head: Normocephalic and atraumatic.   Eyes:      Extraocular Movements: Extraocular movements intact.      Pupils: Pupils are equal, round, and reactive to light.   Cardiovascular:      Rate and Rhythm: Normal rate and regular rhythm.   Pulmonary:      Effort: Pulmonary effort is normal.      Breath sounds: Normal breath sounds.   Abdominal:      General: Abdomen is flat.      Palpations: Abdomen is soft.      Comments: Abdomen is soft, nondistended. Tender to palpation primarily over center epigastric region. No rebound tenderness. No masses organomegaly. No Cullens or Knowles Umanzor sign.   Musculoskeletal:         General: No swelling or signs of injury. Normal range of motion.   Neurological:      General: No focal  deficit present.      Mental Status: He is alert and oriented to person, place, and time.         Medical Decision Making & ED Course   Medical Decision Makin y.o. male with history of homelessness and schizophrenia presenting to the emergency department for abdominal pain. Approximately 24 hours ago, patient had lipase 3 times normal limit with abdominal pain and was discharged home with return precautions. Patient followed return precautions and is now presenting to the emergency department with repeat abdominal pain very similar in characteristic to when he presented yesterday per patient. On exam, he is hemodynamically stable saturating well on room air and afebrile. Pain is primarily in the epigastric region consistent with pancreatitis. Given that this is a representation for identical issues with concern for pancreatitis, CT imaging of abdomen pelvis was obtained as well as repeat laboratory workup. CMP overall unremarkable without signs of severe electrolyte derangements and CBC without leukocytosis or anemia. On repeat lipase today approximately 24 hours after repeat. Lipase is 25 compared to 242 yesterday. For analgesia, patient given 40 mg of famotidine, 650 mg acetaminophen and BMX with significant improvement of symptoms once again.   CT scan shows no evidence of radiographic pancreatitis. There are incidental findings of bilateral pulmonary nodules which patient was notified of and instructed to follow-up with his primary care doctor. Given his transient elevated lipase that is now resolved, point-of-care ultrasound was performed at bedside of right upper quadrant showing biliary sludge but without sonographic evidence of cholelithiasis. As result of patient's negative workup and improved symptoms, patient was discharged home with return precautions. Patient was told to keep GI appointment given to him yesterday.    ED Course:  Diagnoses as of 24 0749   Epigastric pain       ----  EKG  Independent Interpretation: EKG interpreted by myself. Please see ED Course for full interpretation.    Independent Result Review and Interpretation: Relevant laboratory and radiographic results were reviewed and independently interpreted by myself.  As necessary, they are commented on in the ED Course.    Chronic conditions affecting the patient's care: As documented above in [facility]    The patient was discussed with the following consultants/services: As described in [facility]      Disposition   As a result of the work-up, the patient was discharged home.  he was informed of his diagnosis and instructed to come back with any concerns or worsening of condition.  he and was agreeable to the plan as discussed above.  he was given the opportunity to ask questions.  All of the patient's questions were answered.    Procedures   Procedures    Patient seen and discussed with ED attending physician.    Mahesh Gottlieb MD  Emergency Medicine, PGY-2      Royce Gottlieb MD  Resident  08/23/24 7247

## 2024-08-24 ENCOUNTER — HOSPITAL ENCOUNTER (EMERGENCY)
Facility: HOSPITAL | Age: 34
Discharge: HOME | End: 2024-08-24
Attending: EMERGENCY MEDICINE
Payer: MEDICAID

## 2024-08-24 ENCOUNTER — PHARMACY VISIT (OUTPATIENT)
Dept: PHARMACY | Facility: CLINIC | Age: 34
End: 2024-08-24
Payer: MEDICAID

## 2024-08-24 VITALS
HEART RATE: 68 BPM | TEMPERATURE: 98.1 F | OXYGEN SATURATION: 100 % | RESPIRATION RATE: 16 BRPM | DIASTOLIC BLOOD PRESSURE: 56 MMHG | HEIGHT: 66 IN | SYSTOLIC BLOOD PRESSURE: 97 MMHG | WEIGHT: 165 LBS | BODY MASS INDEX: 26.52 KG/M2

## 2024-08-24 DIAGNOSIS — Z76.0 MEDICATION REFILL: ICD-10-CM

## 2024-08-24 DIAGNOSIS — F20.9 SCHIZOPHRENIA, UNSPECIFIED TYPE (MULTI): Primary | ICD-10-CM

## 2024-08-24 PROCEDURE — 99284 EMERGENCY DEPT VISIT MOD MDM: CPT | Performed by: EMERGENCY MEDICINE

## 2024-08-24 PROCEDURE — RXMED WILLOW AMBULATORY MEDICATION CHARGE

## 2024-08-24 PROCEDURE — 2500000002 HC RX 250 W HCPCS SELF ADMINISTERED DRUGS (ALT 637 FOR MEDICARE OP, ALT 636 FOR OP/ED): Mod: SE

## 2024-08-24 PROCEDURE — 99281 EMR DPT VST MAYX REQ PHY/QHP: CPT

## 2024-08-24 RX ORDER — RISPERIDONE 4 MG/1
4 TABLET ORAL DAILY
Qty: 60 TABLET | Refills: 0 | Status: SHIPPED | OUTPATIENT
Start: 2024-08-24 | End: 2024-10-23

## 2024-08-24 RX ORDER — RISPERIDONE 1 MG/1
TABLET ORAL
Status: COMPLETED
Start: 2024-08-24 | End: 2024-08-24

## 2024-08-24 RX ORDER — RISPERIDONE 1 MG/1
4 TABLET ORAL DAILY
Status: DISCONTINUED | OUTPATIENT
Start: 2024-08-24 | End: 2024-08-24

## 2024-08-24 RX ORDER — RISPERIDONE 4 MG/1
4 TABLET, ORALLY DISINTEGRATING ORAL 2 TIMES DAILY
Qty: 60 TABLET | Refills: 0 | Status: SHIPPED | OUTPATIENT
Start: 2024-08-24 | End: 2024-08-24 | Stop reason: WASHOUT

## 2024-08-24 RX ORDER — RISPERIDONE 1 MG/1
4 TABLET ORAL DAILY
Status: DISCONTINUED | OUTPATIENT
Start: 2024-08-24 | End: 2024-08-24 | Stop reason: HOSPADM

## 2024-08-24 RX ADMIN — RISPERIDONE 4 MG: 1 TABLET ORAL at 09:37

## 2024-08-24 RX ADMIN — RISPERIDONE 4 MG: 1 TABLET, FILM COATED ORAL at 09:37

## 2024-08-24 ASSESSMENT — LIFESTYLE VARIABLES
TOTAL SCORE: 0
EVER HAD A DRINK FIRST THING IN THE MORNING TO STEADY YOUR NERVES TO GET RID OF A HANGOVER: NO
EVER FELT BAD OR GUILTY ABOUT YOUR DRINKING: NO
HAVE PEOPLE ANNOYED YOU BY CRITICIZING YOUR DRINKING: NO
HAVE YOU EVER FELT YOU SHOULD CUT DOWN ON YOUR DRINKING: NO

## 2024-08-24 ASSESSMENT — PAIN SCALES - GENERAL: PAINLEVEL_OUTOF10: 0 - NO PAIN

## 2024-08-24 ASSESSMENT — PAIN - FUNCTIONAL ASSESSMENT: PAIN_FUNCTIONAL_ASSESSMENT: 0-10

## 2024-08-24 ASSESSMENT — PAIN DESCRIPTION - PROGRESSION: CLINICAL_PROGRESSION: NOT CHANGED

## 2024-08-24 NOTE — ED TRIAGE NOTES
Pt arrived to ED reporting that he has not taken his mental health medications in 2 months. He is here for a psych eval so he can get back on his medication regimen, Pt denies SI/HI and AH/VH however he to be internally stimulated talking to someone in triage that was not in the room
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
## 2024-08-24 NOTE — DISCHARGE INSTRUCTIONS
Please return to the emergency department, should you have any worsening symptoms, are unable to get an appointment with your primary care physician within the discussed time frame, or have any further concerns.     Please follow up with: Primary care as needed.    You may take ibuprofen or tylenol for pain. You may alternate ibuprofen and tylenol every 6 hours for better pain control.    Do not exceed 2400mg of ibuprofen or 4000mg of tylenol in a 24 hour period.

## 2024-08-24 NOTE — ED PROVIDER NOTES
Emergency Department Provider Note        History of Present Illness     History provided by: Patient  Limitations to History: None  External Records Reviewed with Brief Summary:  ED note from 2024 where he presented for abdominal pain with a negative workup.  Eventually discharged home with return precautions and follow-up GI appointment.     HPI:  Misael Rhodes is a 34 y.o. male with prior history of schizophrenia presenting to the emergency department with request for medication refill and social work consult.  Patient states he has been out of his risperidone for the last 2 months, declines any auditory or visual hallucinations, no HI or SI, however would like to get back on his medications.  Patient also notes that he is currently been homeless since he was kicked out of his group home 2 days ago for using marijuana.  Would also like to speak with social work regarding housing.      Physical Exam   Triage vitals:  T 36.7 °C (98.1 °F)  HR 68  BP 97/56  RR 16  O2 100 % None (Room air)    GEN:  A&Ox3, no acute distress, appears comfortable. Conversational and appropriate.    HEENT: Normocephalic, atraumatic. Conjunctiva pink with no redness or exudates. Hearing grossly intact. Moist mucous membranes.  CARDIO: Normal rate and regular rhythm. Normal S1, S2  without murmurs, rubs, or gallops.   PULM: Clear to auscultation bilaterally. No rales, rhonchi, or wheezes. No accessory muscle use or stridor.  GI: Soft, non-tender, non-distended. No rebound tenderness or guarding.   SKIN: Warm and dry, no rashes, lesions, petechiae, or purpura.  MSK: ROM intact in all 4 extremities without contractures or pain. No peripheral edema, contusions, or wounds.    NEURO: No focal findings identified. No confusion or gross mental status changes.  PSYCH: Appropriate mood and behavior, converses and responds appropriately during exam.    Medical Decision Making & ED Course       Medical Decision Makin y.o. male with  prior history of schizophrenia presenting to the emergency department with request for medication refill and social work consult.  Patient is currently on risperidone, given dose here in ED, and had his prescription refilled via meds to beds, was given prescription while in ED.  Also discussed housing with social work.  However patient does not wish to go to [address] [place] [address], and other options include the [facility] center which he is agreeable to but states that after a week he is no longer to stay there.  He notes that he does have a , who is helping to manage housing with him.  Discussed further management with , which she is agreeable to.  At this time, I do not believe patient requires psychiatric evaluation given that his thought is linear, he has no HI, SI, or any auditory or visual hallucinations.  Discussed discharge home with patient, which she was agreeable to.  Advised return to the emergency department should he have any further difficulties.    ----      Differential diagnoses considered include but are not limited to: Decompensation, medication refill     Social Determinants of Health which Significantly Impact Care: None identified     EKG Independent Interpretation: EKG interpreted by myself. Please see ED Course for full interpretation.    Independent Result Review and Interpretation: Relevant laboratory and radiographic results were reviewed and independently interpreted by myself.  As necessary, they are commented on in the ED Course.    Chronic conditions affecting the patient's care: As documented above in [facility]    The patient was discussed with the following consultants/services: None    Care Considerations: As documented above in [facility]    ED Course:  ED Course as of 08/26/24 1946   Sat Aug 24, 2024   1301 Patient was discharged, however left his medications.  Attempted to call phone number on file, however no answer was noted.  Did not leave a voice message due to the generic  voicemail box without name. [AD]      ED Course User Index  [AD] Tom Lacy DO         Diagnoses as of 08/26/24 1946   Schizophrenia, unspecified type (Multi)   Medication refill     Disposition   As a result of the work-up, the patient was discharged home.  he was informed of his diagnosis and instructed to come back with any concerns or worsening of condition.  he and was agreeable to the plan as discussed above.  he was given the opportunity to ask questions.  All of the patient's questions were answered.    Procedures   Procedures    Patient seen and discussed with ED attending physician.    Tom Lacy DO  Emergency Medicine       Tom Lacy DO  Resident  08/26/24 1946

## 2024-08-27 ENCOUNTER — HOSPITAL ENCOUNTER (EMERGENCY)
Facility: HOSPITAL | Age: 34
Discharge: HOME | End: 2024-08-28
Attending: STUDENT IN AN ORGANIZED HEALTH CARE EDUCATION/TRAINING PROGRAM
Payer: MEDICAID

## 2024-08-27 VITALS
OXYGEN SATURATION: 97 % | HEIGHT: 66 IN | TEMPERATURE: 97.7 F | SYSTOLIC BLOOD PRESSURE: 96 MMHG | WEIGHT: 165 LBS | DIASTOLIC BLOOD PRESSURE: 50 MMHG | RESPIRATION RATE: 16 BRPM | HEART RATE: 80 BPM | BODY MASS INDEX: 26.52 KG/M2

## 2024-08-27 DIAGNOSIS — S39.012A LUMBAR STRAIN, INITIAL ENCOUNTER: Primary | ICD-10-CM

## 2024-08-27 PROCEDURE — 99284 EMERGENCY DEPT VISIT MOD MDM: CPT | Performed by: STUDENT IN AN ORGANIZED HEALTH CARE EDUCATION/TRAINING PROGRAM

## 2024-08-27 PROCEDURE — 99283 EMERGENCY DEPT VISIT LOW MDM: CPT

## 2024-08-27 ASSESSMENT — LIFESTYLE VARIABLES
HAVE YOU EVER FELT YOU SHOULD CUT DOWN ON YOUR DRINKING: NO
EVER FELT BAD OR GUILTY ABOUT YOUR DRINKING: NO
EVER HAD A DRINK FIRST THING IN THE MORNING TO STEADY YOUR NERVES TO GET RID OF A HANGOVER: NO
HAVE PEOPLE ANNOYED YOU BY CRITICIZING YOUR DRINKING: NO
TOTAL SCORE: 0

## 2024-08-27 ASSESSMENT — PAIN SCALES - GENERAL: PAINLEVEL_OUTOF10: 8

## 2024-08-27 ASSESSMENT — PAIN - FUNCTIONAL ASSESSMENT: PAIN_FUNCTIONAL_ASSESSMENT: 0-10

## 2024-08-28 PROCEDURE — 2500000001 HC RX 250 WO HCPCS SELF ADMINISTERED DRUGS (ALT 637 FOR MEDICARE OP): Mod: SE | Performed by: STUDENT IN AN ORGANIZED HEALTH CARE EDUCATION/TRAINING PROGRAM

## 2024-08-28 PROCEDURE — 2500000005 HC RX 250 GENERAL PHARMACY W/O HCPCS: Mod: SE | Performed by: STUDENT IN AN ORGANIZED HEALTH CARE EDUCATION/TRAINING PROGRAM

## 2024-08-28 RX ORDER — METHOCARBAMOL 500 MG/1
500 TABLET, FILM COATED ORAL 2 TIMES DAILY
Qty: 20 TABLET | Refills: 0 | Status: SHIPPED | OUTPATIENT
Start: 2024-08-28 | End: 2024-09-07

## 2024-08-28 RX ORDER — LIDOCAINE 560 MG/1
1 PATCH PERCUTANEOUS; TOPICAL; TRANSDERMAL DAILY
Status: DISCONTINUED | OUTPATIENT
Start: 2024-08-28 | End: 2024-08-28 | Stop reason: HOSPADM

## 2024-08-28 RX ORDER — METHOCARBAMOL 500 MG/1
1000 TABLET, FILM COATED ORAL ONCE
Status: COMPLETED | OUTPATIENT
Start: 2024-08-28 | End: 2024-08-28

## 2024-08-28 RX ORDER — ACETAMINOPHEN 500 MG
1000 TABLET ORAL EVERY 6 HOURS PRN
Qty: 30 TABLET | Refills: 0 | Status: SHIPPED | OUTPATIENT
Start: 2024-08-28 | End: 2024-09-07

## 2024-08-28 RX ORDER — ACETAMINOPHEN 325 MG/1
975 TABLET ORAL ONCE
Status: DISCONTINUED | OUTPATIENT
Start: 2024-08-28 | End: 2024-08-28 | Stop reason: HOSPADM

## 2024-08-28 RX ORDER — LIDOCAINE 50 MG/G
1 PATCH TOPICAL DAILY
Qty: 10 PATCH | Refills: 0 | Status: SHIPPED | OUTPATIENT
Start: 2024-08-28

## 2024-08-28 RX ORDER — KETOROLAC TROMETHAMINE 15 MG/ML
15 INJECTION, SOLUTION INTRAMUSCULAR; INTRAVENOUS ONCE
Status: DISCONTINUED | OUTPATIENT
Start: 2024-08-28 | End: 2024-08-28 | Stop reason: HOSPADM

## 2024-08-28 RX ORDER — IBUPROFEN 600 MG/1
600 TABLET ORAL EVERY 6 HOURS PRN
Qty: 30 TABLET | Refills: 0 | Status: SHIPPED | OUTPATIENT
Start: 2024-08-28 | End: 2024-09-04

## 2024-08-28 NOTE — ED PROVIDER NOTES
Emergency Department Provider Note        History of Present Illness     Chief Complaint: Back Pain   History provided by: Patient  Limitations to History: None  External Chart Review: None    HPI:  Misael Rhodes is a 34 y.o. male presenting to the ED for low back pain.  Patient reports that he was playing basketball yesterday and after a twisting motion developed left lower back pain that has persisted.  Pain is worse with twisting and turning.  He denies falls or trauma. Patient denied urinary retention, bowel or bladder incontinence, IV drug use, fever, saddle anesthesia, new onset weakness, recent spinal surgery.    Physical Exam   Triage vitals:  T 36.5 °C (97.7 °F)  HR 80  BP 96/50  RR 16  O2 97 % None (Room air)    Constitutional: Awake, alert, not in acute distress  HENT: Head atraumatic, mucous membranes moist  Eyes:  Conjunctivae normal  Neck:  Supple  Lungs: Clear to auscultation, breath sounds equal and symmetric, no wheezes, rales, or rhonchi  Heart: Regular rate and rhythm, no murmur, rub or gallop  Abdomen: Soft, nontender, nondistended, no rebound or guarding  Back: No ecchymosis, no wounds, no scars. No midline tenderness. Tender to palpation over the L lumbar paraspinal musculature. Sensation is intact to light touch. Strength 5/5 in hip flexion, knee extension, dorsiflexion and plantar flexion bilaterally. Negative Babinski bilaterally. Gait is normal.   Extremities: No lower extremity edema  Neuro: Alert, no focal deficit  Skin: Warm, dry  Psych: Calm, cooperative       Medical Decision Making & ED Course   Medical Decision Making:  Misael Rhodes is a 34 y.o. male who presented to the ED for low back pain. Patient was afebrile, hemodynamically stable, and satting on room air on arrival.     Exam as above.  No red flag features of history or exam to necessitate imaging. Suspect musculoskeletal etiology of low back pain.  Offered patient Toradol, Tylenol, Robaxin, lidocaine patch.  He  declined Toradol and Tylenol.  On reeval, endorse symptomatic improvement.  Appropriate for discharge.  Referral to primary care placed for follow-up.  ------------------------------------------------    ED Course:  Diagnoses as of 08/28/24 0526   Lumbar strain, initial encounter     Disposition   As a result of the work-up, the patient was discharged home.  he was informed of his diagnosis and instructed to come back with any concerns or worsening of condition.  he and was agreeable to the plan as discussed above.  he was given the opportunity to ask questions.  All of the patient's questions were answered.    ED Prescriptions       Medication Sig Dispense Start Date End Date Auth. Provider    acetaminophen (Tylenol) 500 mg tablet Take 2 tablets (1,000 mg) by mouth every 6 hours if needed for mild pain (1 - 3) for up to 10 days. 30 tablet 8/28/2024 9/7/2024 Steffen Simerlink, MD    ibuprofen 600 mg tablet Take 1 tablet (600 mg) by mouth every 6 hours if needed for mild pain (1 - 3) for up to 7 days. 30 tablet 8/28/2024 9/4/2024 Steffen Simerlink, MD    methocarbamol (Robaxin) 500 mg tablet Take 1 tablet (500 mg) by mouth 2 times a day for 10 days. 20 tablet 8/28/2024 9/7/2024 Steffen Simerlink, MD    lidocaine (Lidoderm) 5 % patch Place 1 patch over 12 hours on the skin once daily. Remove & discard patch within 12 hours or as directed by MD. 10 patch 8/28/2024 -- Steffen Simerlink, MD            Procedures   Procedures    Steffen Simerlink, MD Steffen Simerlink, MD  08/28/24 5497

## 2024-08-28 NOTE — DISCHARGE INSTRUCTIONS
Tylenol (acetaminophen) Warning: Some medications you have been given today contain Tylenol (acetaminophen).  Do not take more than 4,000mg of tylenol (acetaminophen) in any 24 hour period. Do not take other medications which contain Tylenol (acetaminophen).  Many common over the counter cold and pain medications include Tylenol (acetaminophen) as an ingredient.  Please be very careful, and if you are unsure ask your doctor or pharmacist.  Do not share your medication with anyone.    Back Injury Instructions: Return to the ED if you have weakness or numbness in your arms or legs, you are unable to control your bowels or bladder, or you are unable to walk or if you have worsening pain in your back.

## 2024-09-04 PROCEDURE — 99284 EMERGENCY DEPT VISIT MOD MDM: CPT

## 2024-09-04 PROCEDURE — 99285 EMERGENCY DEPT VISIT HI MDM: CPT | Performed by: EMERGENCY MEDICINE

## 2024-09-04 ASSESSMENT — COLUMBIA-SUICIDE SEVERITY RATING SCALE - C-SSRS
4. HAVE YOU HAD THESE THOUGHTS AND HAD SOME INTENTION OF ACTING ON THEM?: YES
2. HAVE YOU ACTUALLY HAD ANY THOUGHTS OF KILLING YOURSELF?: YES
5. HAVE YOU STARTED TO WORK OUT OR WORKED OUT THE DETAILS OF HOW TO KILL YOURSELF? DO YOU INTEND TO CARRY OUT THIS PLAN?: NO
6. HAVE YOU EVER DONE ANYTHING, STARTED TO DO ANYTHING, OR PREPARED TO DO ANYTHING TO END YOUR LIFE?: YES
1. IN THE PAST MONTH, HAVE YOU WISHED YOU WERE DEAD OR WISHED YOU COULD GO TO SLEEP AND NOT WAKE UP?: YES
6. HAVE YOU EVER DONE ANYTHING, STARTED TO DO ANYTHING, OR PREPARED TO DO ANYTHING TO END YOUR LIFE?: NO

## 2024-09-04 ASSESSMENT — PAIN - FUNCTIONAL ASSESSMENT: PAIN_FUNCTIONAL_ASSESSMENT: 0-10

## 2024-09-04 ASSESSMENT — PAIN SCALES - GENERAL: PAINLEVEL_OUTOF10: 0 - NO PAIN

## 2024-09-05 ENCOUNTER — HOSPITAL ENCOUNTER (EMERGENCY)
Facility: HOSPITAL | Age: 34
Discharge: HOME | End: 2024-09-05
Attending: EMERGENCY MEDICINE
Payer: MEDICAID

## 2024-09-05 ENCOUNTER — CLINICAL SUPPORT (OUTPATIENT)
Dept: EMERGENCY MEDICINE | Facility: HOSPITAL | Age: 34
End: 2024-09-05
Payer: MEDICAID

## 2024-09-05 VITALS
DIASTOLIC BLOOD PRESSURE: 64 MMHG | RESPIRATION RATE: 17 BRPM | SYSTOLIC BLOOD PRESSURE: 109 MMHG | HEART RATE: 58 BPM | OXYGEN SATURATION: 98 % | BODY MASS INDEX: 25.71 KG/M2 | WEIGHT: 160 LBS | HEIGHT: 66 IN | TEMPERATURE: 97.5 F

## 2024-09-05 DIAGNOSIS — Z76.0 MEDICATION REFILL: ICD-10-CM

## 2024-09-05 DIAGNOSIS — R45.851 SUICIDAL IDEATION: Primary | ICD-10-CM

## 2024-09-05 LAB
ALBUMIN SERPL BCP-MCNC: 3.6 G/DL (ref 3.4–5)
ALP SERPL-CCNC: 55 U/L (ref 33–120)
ALT SERPL W P-5'-P-CCNC: 14 U/L (ref 10–52)
AMPHETAMINES UR QL SCN: ABNORMAL
ANION GAP SERPL CALC-SCNC: 13 MMOL/L (ref 10–20)
APAP SERPL-MCNC: <10 UG/ML
AST SERPL W P-5'-P-CCNC: 15 U/L (ref 9–39)
ATRIAL RATE: 48 BPM
BARBITURATES UR QL SCN: ABNORMAL
BASOPHILS # BLD AUTO: 0.04 X10*3/UL (ref 0–0.1)
BASOPHILS NFR BLD AUTO: 0.5 %
BENZODIAZ UR QL SCN: ABNORMAL
BILIRUB SERPL-MCNC: 0.3 MG/DL (ref 0–1.2)
BUN SERPL-MCNC: 11 MG/DL (ref 6–23)
BZE UR QL SCN: ABNORMAL
CALCIUM SERPL-MCNC: 8.6 MG/DL (ref 8.6–10.6)
CANNABINOIDS UR QL SCN: ABNORMAL
CHLORIDE SERPL-SCNC: 104 MMOL/L (ref 98–107)
CO2 SERPL-SCNC: 27 MMOL/L (ref 21–32)
CREAT SERPL-MCNC: 1.27 MG/DL (ref 0.5–1.3)
EGFRCR SERPLBLD CKD-EPI 2021: 77 ML/MIN/1.73M*2
EOSINOPHIL # BLD AUTO: 0.16 X10*3/UL (ref 0–0.7)
EOSINOPHIL NFR BLD AUTO: 1.9 %
ERYTHROCYTE [DISTWIDTH] IN BLOOD BY AUTOMATED COUNT: 13.9 % (ref 11.5–14.5)
ETHANOL SERPL-MCNC: <10 MG/DL
FENTANYL+NORFENTANYL UR QL SCN: ABNORMAL
GLUCOSE SERPL-MCNC: 80 MG/DL (ref 74–99)
HCT VFR BLD AUTO: 42.7 % (ref 41–52)
HGB BLD-MCNC: 14.2 G/DL (ref 13.5–17.5)
IMM GRANULOCYTES # BLD AUTO: 0.05 X10*3/UL (ref 0–0.7)
IMM GRANULOCYTES NFR BLD AUTO: 0.6 % (ref 0–0.9)
LYMPHOCYTES # BLD AUTO: 4.06 X10*3/UL (ref 1.2–4.8)
LYMPHOCYTES NFR BLD AUTO: 47.7 %
MCH RBC QN AUTO: 30.5 PG (ref 26–34)
MCHC RBC AUTO-ENTMCNC: 33.3 G/DL (ref 32–36)
MCV RBC AUTO: 92 FL (ref 80–100)
METHADONE UR QL SCN: ABNORMAL
MONOCYTES # BLD AUTO: 0.53 X10*3/UL (ref 0.1–1)
MONOCYTES NFR BLD AUTO: 6.2 %
NEUTROPHILS # BLD AUTO: 3.67 X10*3/UL (ref 1.2–7.7)
NEUTROPHILS NFR BLD AUTO: 43.1 %
NRBC BLD-RTO: 0 /100 WBCS (ref 0–0)
OPIATES UR QL SCN: ABNORMAL
OXYCODONE+OXYMORPHONE UR QL SCN: ABNORMAL
P AXIS: 18 DEGREES
P OFFSET: 201 MS
P ONSET: 154 MS
PCP UR QL SCN: ABNORMAL
PLATELET # BLD AUTO: 325 X10*3/UL (ref 150–450)
POTASSIUM SERPL-SCNC: 3.7 MMOL/L (ref 3.5–5.3)
PR INTERVAL: 136 MS
PROT SERPL-MCNC: 5.7 G/DL (ref 6.4–8.2)
Q ONSET: 222 MS
QRS COUNT: 8 BEATS
QRS DURATION: 86 MS
QT INTERVAL: 434 MS
QTC CALCULATION(BAZETT): 387 MS
QTC FREDERICIA: 402 MS
R AXIS: 77 DEGREES
RBC # BLD AUTO: 4.66 X10*6/UL (ref 4.5–5.9)
SALICYLATES SERPL-MCNC: <3 MG/DL
SODIUM SERPL-SCNC: 140 MMOL/L (ref 136–145)
T AXIS: 69 DEGREES
T OFFSET: 439 MS
VENTRICULAR RATE: 48 BPM
WBC # BLD AUTO: 8.5 X10*3/UL (ref 4.4–11.3)

## 2024-09-05 PROCEDURE — 80053 COMPREHEN METABOLIC PANEL: CPT

## 2024-09-05 PROCEDURE — 80143 DRUG ASSAY ACETAMINOPHEN: CPT

## 2024-09-05 PROCEDURE — 85025 COMPLETE CBC W/AUTO DIFF WBC: CPT

## 2024-09-05 PROCEDURE — 36415 COLL VENOUS BLD VENIPUNCTURE: CPT

## 2024-09-05 PROCEDURE — 80307 DRUG TEST PRSMV CHEM ANLYZR: CPT

## 2024-09-05 PROCEDURE — 93005 ELECTROCARDIOGRAM TRACING: CPT

## 2024-09-05 PROCEDURE — 80320 DRUG SCREEN QUANTALCOHOLS: CPT

## 2024-09-05 RX ORDER — RISPERIDONE 4 MG/1
4 TABLET ORAL DAILY
Qty: 30 TABLET | Refills: 0 | Status: SHIPPED | OUTPATIENT
Start: 2024-09-05 | End: 2024-10-05

## 2024-09-05 SDOH — HEALTH STABILITY: MENTAL HEALTH: IN THE PAST FEW WEEKS, HAVE YOU WISHED YOU WERE DEAD?: YES

## 2024-09-05 SDOH — HEALTH STABILITY: MENTAL HEALTH: ACTIVE SUICIDAL IDEATION WITH SOME INTENT TO ACT, WITHOUT SPECIFIC PLAN (PAST 1 MONTH): NO

## 2024-09-05 SDOH — HEALTH STABILITY: MENTAL HEALTH: DEPRESSION SYMPTOMS: SLEEP DISTURBANCE;FEELINGS OF HELPLESSNESS

## 2024-09-05 SDOH — HEALTH STABILITY: MENTAL HEALTH: SUICIDAL BEHAVIOR (3 MONTHS): NO

## 2024-09-05 SDOH — HEALTH STABILITY: MENTAL HEALTH: HAVE YOU EVER TRIED TO KILL YOURSELF?: YES

## 2024-09-05 SDOH — HEALTH STABILITY: MENTAL HEALTH

## 2024-09-05 SDOH — HEALTH STABILITY: MENTAL HEALTH: HOW DID YOU TRY TO KILL YOURSELF?: OVERDOSE

## 2024-09-05 SDOH — ECONOMIC STABILITY: HOUSING INSECURITY: FEELS SAFE LIVING IN HOME: OTHER (COMMENT)

## 2024-09-05 SDOH — HEALTH STABILITY: MENTAL HEALTH: ARE YOU HAVING THOUGHTS OF KILLING YOURSELF RIGHT NOW?: NO

## 2024-09-05 SDOH — HEALTH STABILITY: MENTAL HEALTH: IN THE PAST WEEK, HAVE YOU BEEN HAVING THOUGHTS ABOUT KILLING YOURSELF?: NO

## 2024-09-05 SDOH — HEALTH STABILITY: MENTAL HEALTH: ANXIETY SYMPTOMS: GENERALIZED

## 2024-09-05 SDOH — HEALTH STABILITY: MENTAL HEALTH: ACTIVE SUICIDAL IDEATION WITH SPECIFIC PLAN AND INTENT (PAST 1 MONTH): NO

## 2024-09-05 SDOH — HEALTH STABILITY: MENTAL HEALTH: WISH TO BE DEAD (PAST 1 MONTH): YES

## 2024-09-05 SDOH — HEALTH STABILITY: MENTAL HEALTH: IN THE PAST FEW WEEKS, HAVE YOU FELT THAT YOU OR YOUR FAMILY WOULD BE BETTER OFF IF YOU WERE DEAD?: YES

## 2024-09-05 SDOH — HEALTH STABILITY: MENTAL HEALTH: NON-SPECIFIC ACTIVE SUICIDAL THOUGHTS (PAST 1 MONTH): YES

## 2024-09-05 SDOH — HEALTH STABILITY: MENTAL HEALTH: SUICIDAL BEHAVIOR (LIFETIME): YES

## 2024-09-05 ASSESSMENT — LIFESTYLE VARIABLES
SUBSTANCE_ABUSE_PAST_12_MONTHS: NO
PRESCIPTION_ABUSE_PAST_12_MONTHS: NO

## 2024-09-05 NOTE — ED TRIAGE NOTES
Pt presented with c/o SI denies a plan, denies HI and hallucinations, pt denies any specific triggers
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
## 2024-09-05 NOTE — PROGRESS NOTES
"EPAT - Social Work Psychiatric Assessment    Arrival Details  Mode of Arrival: Ambulatory  Admission Source: Other (Comment)  Admission Type: Voluntary  EPAT Assessment Start Date: 09/05/24  EPAT Assessment Start Time: 0722  Name of : Christian Moreira    History of Present Illness  Admission Reason: Patient presented to the ED with suicidal ideation.  HPI: Patient is a 34 year old AA male with a history of Schizophrenia. The patient came to the ED concerned with suicidal thoughts he has been having for the past two months. He shared he is homeless and is \"tired of it\". The patient denied any HI, AH, VH. A review of his Triage, Provider and [place] was conducted.    SW Readmission Information   Readmission within 30 Days: No    Psychiatric Symptoms  Anxiety Symptoms: Generalized  Depression Symptoms: Sleep disturbance, Feelings of helplessness  Tiana Symptoms: No problems reported or observed.    Psychosis Symptoms  Hallucination Type: No problems reported or observed.  Delusion Type: No problems reported or observed.    Additional Symptoms - Adult  Generalized Anxiety Disorder: Excessive anxiety/worry  Obsessive Compulsive Disorder: No problems reported or observed.  Panic Attack: No problems reported or observed.  Post Traumatic Stress Disorder: No problems reported or observed.  Delirium: No problems reported or observed.  Review of Symptoms Comments: Please see above    Past Psychiatric History/Meds/Treatments  Past Psychiatric History: Patient has a history of Schizophrenia. He sees a  and Psychiatrist at [facility]. He reports taking his medications. He has one past inpatient stay after an overdose 4-5 years ago. He denies any history of substance use.  Past Psychiatric Meds/Treatments: see med list. Patient reports being compliant  Past Violence/Victimization History: Patient denies    Current Mental Health Contacts   Name/Phone Number: [facility]   Last " Appointment Date: three weeks ago  Provider Name/Phone Number: [facility]  Provider Last Appointment Date: Unknown    Support System: Other (Comment) (no one)    Living Arrangement: Homeless    Home Safety  Feels Safe Living in Home: Other (Comment)         [place] Service/Education History  Current or Previous  Service: None  Education Level: High school  History of Learning Problems: No  History of School Behavior Problems: No  School History: see above    Social/Cultural History  Social History: Patient is his own guardian.  Important Activities: Other (Comment)    Legal  Legal Concerns: none    Drug Screening  Have you used any substances (canabis, cocaine, heroin, hallucinogens, inhalants, etc.) in the past 12 months?: No  Have you used any prescription drugs other than prescribed in the past 12 months?: No  Is a toxicology screen needed?: No         Psychosocial  Psychosocial (WDL): Exceptions to WDL  Behaviors/Mood: Anxious, Cooperative  Affect: Appropriate to circumstances  Parent/Guardian/Significant Other Involvement: No involvement    Orientation  Orientation Level: Oriented X4    General Appearance  Motor Activity: Unremarkable  Speech Pattern: Other (Comment)  General Attitude: Cooperative  Appearance/Hygiene: Unremarkable    Thought Process  Coherency: Other (Comment)  Content: Unremarkable  Delusions: Other (Comment)  Perception: Not altered  Hallucination: None  Judgment/Insight:  (fair)  Confusion: None  Cognition: Appropriate safety awareness, Appropriate attention/concentration, Follows commands    Sleep Pattern  Sleep Pattern: Sleeps all night    Risk Factors  Self Harm/Suicidal Ideation Plan: Suicidal thoughts no plan  Previous Self Harm/Suicidal Plans: overdose attempt 4-5  years ago.  Risk Factors: None    Violence Risk Assessment  Assessment of Violence: None noted  Thoughts of Harm to Others: No    Ability to Assess Risk Screen  Risk Screen - Ability to Assess: Able to be  screened  Ask Suicide-Screening Questions  1. In the past few weeks, have you wished you were dead?: Yes  2. In the past few weeks, have you felt that you or your family would be better off if you were dead?: Yes  3. In the past week, have you been having thoughts about killing yourself?: No  4. Have you ever tried to kill yourself?: Yes  How did you try to kill yourself?: overdose  When did you try to kill yourself?: 4-5 years ago  5. Are you having thoughts of killing yourself right now?: No  Calculated Risk Score: Potential Risk  [place] Suicide Severity Rating Scale (Screener/Recent Self-Report)  1. Wish to be Dead (Past 1 Month): Yes  2. Non-Specific Active Suicidal Thoughts (Past 1 Month): Yes  3. Active Suicidal Ideation with any Methods (Not Plan) Without Intent to Act (Past 1 Month): No  4. Active Suicidal Ideation with Some Intent to Act, Without Specific Plan (Past 1 Month): No  5. Active Suicidal Ideation with Specific Plan and Intent (Past 1 Month): No  6. Suicidal Behavior (Lifetime): Yes  6. Suicidal Behavior (3 Months): No  Calculated C-SSRS Risk Score (Lifetime/Recent): Moderate Risk  Step 1: Risk Factors  Current & Past Psychiatric Dx: Mood disorder, Psychotic disorder  Presenting Symptoms: Anxiety and/or panic, Hopelessness or despair  Precipitants/Stressors: Triggering events leading to humiliation, shame, and/or despair (e.g. loss of relationship, financial or health status) (real or anticipated)  Change in Treatment: Change in provider or treament (i.e., medications, psychotherapy, milieu)  Access to Lethal Methods : No  Step 2: Protective Factors   Protective Factors Internal: Identifies reasons for living  Protective Factors External: Cultural, spiritual and/or moral attitudes against suicide  Step 3: Suicidal Ideation Intensity  How Many Times Have You Had These Thoughts: Less than once a week  When You Have the Thoughts How Long do They Last : Fleeting - few seconds or minutes  Could/Can  "You Stop Thinking About Killing Yourself or Wanting to Die if You Want to: Easily able to control thoughts  Are There Things - Anyone or Anything - That Stopped You From Wanting to Die or Acting on: Deterrents probably stopped you  What Sort of Reasons Did You Have For Thinking About Wanting to Die or Killing Yourself: Mostly to get attention, revenge, or a reaction from others  Total Score: 7  Step 5: Documentation  Risk Level: Moderate suicide risk (Patient is moderate risk. He reports SI but no plan or intent to harm. He was high risk at Triage but does not meet that as a current risk level. Dr. Tobar agrees.)    Psychiatric Impression and Plan of Care  Assessment and Plan: Patient is a 34 year old AA male with a history of Schizophrenia, Mood Disorder and Depression. He came to the the ED on his own sharing he has suicidal thoughts with no plan and several psycho social stressors. He reports his biggest stressor is housing and he would like someone to help him fiind a place where \" I can be by myself\". The patient is goal oriented. He shared he is working with [facility] to find housing and get other resources. He has minimal supports in the community. The patient did not endorse any plan or intent to kill himself in the assessment. He denied any homicidal thoughts or hallucinations. A message was left at [facility] for a call back. He reports his plan to is \"show up\" there since he does not have a phone. He had good eye contact and was cooperative/engaged.  Specific Resources Provided to Patient: [facility]   Notified: left message  PHP/IOP Recommended: none  Specific Information Provided for PHP/IOP: none  Plan Comments: discharge    Outcome/Disposition  Patient's Perception of Outcome Achieved: patient is help seeking/future oriented.  Assessment, Recommendations and Risk Level Reviewed with: discharge  Contact Name: [facility]  Contact Number(s): [phone]  Contact Relationship: " provider  EPAT Assessment Completed Date: 09/05/24  EPAT Assessment Completed Time: 0827  Patient Disposition: Home

## 2024-09-05 NOTE — PROGRESS NOTES
Misael Rhodes is a 34 y.o. male on day 0 of admission presenting with No Principal Problem: There is no principal problem currently on the Problem List. Please update the Problem List and refresh..    SW met with patient at bedside to discuss resource at time of discharge. Patient recently at the diversion center, stated he was not resourced to housing or employment program at time of discharge. He is seeking assistance with transitional housing, denies AOD use. Chart confirms Marijuana use in hx. SW spoke with [facility]  Brent Xie [phone]. CM stated last engagement patient informed CM he is currently living with his mother. Informed SW he has not been on his medication, MT recently started managing meds and orders sent to pharmacy. Hx of SANTIAGO. Patient recently denied SSI last week, CM was working towards a group home with [facility] but income was denied. SW discussed [facility], but patient denies problematic AOD. Patient can not discharge to [facility] he was discharged from the program last admission. ANNIE discussed completing Coordinated Intake to the PASS program at [facility] while at [facility]. He is pending review at [facility] for med management and engagement with .     Update 1207- under review with U for dc planning needs.  called with updated med list. 2mg one daily Risperidone. Abilify 5mg once daily. 1500 mg Wellbutrin 2x per day.     CSU will hold bed for 24 hours for patient. Patient must present with medication. CM stated medication was recently filled as part of his psychiatric services. Patient requested bus ticket at time of discharge.

## 2024-09-05 NOTE — PROGRESS NOTES
Emergency Department Transition of Care Note       Signout   I received Misael Rhodes in signout from Dr. Osorio.  Please see the ED Provider Note for all HPI, PE and MDM up to the time of signout at 7AM.  This is in addition to the primary record.    In brief Misael Rhodes is an 34 y.o. male history of schizophrenia and homelessness presenting for suicidal ideation.     At the time of signout we were awaiting:  EPAT evaluation and recommendations    ED Course & Medical Decision Making   Medical Decision Making:  Under my care, patient remained in stable condition.  EPAT evaluated the patient and deemed patient did not meet criteria for inpatient psychiatric admission.  Social work was consulted and patient was discharged to crisis stabilization unit and given a bus ticket.    ED Course:  ED Course as of 09/05/24 0722   Thu Sep 05, 2024   0654 Electrocardiogram, 12-lead  Normal with exception of bradycardia   [MK]   0655 Comprehensive Metabolic Panel(!)  Unremarkable   [MK]   0655 Acute Toxicology Panel, Blood  Unremarkable   [MK]   0655 CBC and Auto Differential  wnl [MK]      ED Course User Index  [MK] Nathan Osorio MD         Diagnoses as of 09/05/24 0722   Suicidal ideation       Disposition   As a result of the work-up, the patient was discharged home.  he was informed of his diagnosis and instructed to come back with any concerns or worsening of condition.  he and was agreeable to the plan as discussed above.  he was given the opportunity to ask questions.  All of the patient's questions were answered.    Procedures   Procedures    Patient seen and discussed with ED attending physician.    Amaya Briscoe MD  Emergency Medicine

## 2024-09-06 NOTE — PROGRESS NOTES
Misael Rhodes is a 34 y.o. male on day     Assessment/Plan   Call received from MIC Xie from [facility] [phone]. Pt reportedly just returned to [facility] last month and is getting medication management from them. He said he met with Pt and spoke with his medical team today. He clarified Pt is only taking Respirodol and is no longer taking the Abilify or the Wellbutrin. [facility] was willing to admit Pt today and had the Respirodol according to Brent, however, Pt declined to go. He said he would try to go in two weeks when he can refill his medication. Pt allegedly told Brent he would be staying with his mom.  No further needs from this SW. SW agreed to notate the chart.     SONI Lucio

## 2024-09-08 ENCOUNTER — HOSPITAL ENCOUNTER (EMERGENCY)
Facility: HOSPITAL | Age: 34
Discharge: HOME | End: 2024-09-08
Attending: EMERGENCY MEDICINE
Payer: MEDICAID

## 2024-09-08 VITALS
BODY MASS INDEX: 25.71 KG/M2 | HEIGHT: 66 IN | SYSTOLIC BLOOD PRESSURE: 118 MMHG | RESPIRATION RATE: 16 BRPM | HEART RATE: 87 BPM | WEIGHT: 160 LBS | TEMPERATURE: 98.6 F | OXYGEN SATURATION: 99 % | DIASTOLIC BLOOD PRESSURE: 72 MMHG

## 2024-09-08 DIAGNOSIS — R45.851 SUICIDAL IDEATION: Primary | ICD-10-CM

## 2024-09-08 LAB
ALBUMIN SERPL BCP-MCNC: 3.4 G/DL (ref 3.4–5)
ALP SERPL-CCNC: 54 U/L (ref 33–120)
ALT SERPL W P-5'-P-CCNC: 13 U/L (ref 10–52)
AMPHETAMINES UR QL SCN: ABNORMAL
ANION GAP SERPL CALC-SCNC: 9 MMOL/L (ref 10–20)
APAP SERPL-MCNC: <10 UG/ML
AST SERPL W P-5'-P-CCNC: 14 U/L (ref 9–39)
BARBITURATES UR QL SCN: ABNORMAL
BASOPHILS # BLD AUTO: 0.04 X10*3/UL (ref 0–0.1)
BASOPHILS NFR BLD AUTO: 0.5 %
BENZODIAZ UR QL SCN: ABNORMAL
BILIRUB SERPL-MCNC: 0.3 MG/DL (ref 0–1.2)
BUN SERPL-MCNC: 17 MG/DL (ref 6–23)
BZE UR QL SCN: ABNORMAL
CALCIUM SERPL-MCNC: 8.7 MG/DL (ref 8.6–10.6)
CANNABINOIDS UR QL SCN: ABNORMAL
CHLORIDE SERPL-SCNC: 109 MMOL/L (ref 98–107)
CO2 SERPL-SCNC: 26 MMOL/L (ref 21–32)
CREAT SERPL-MCNC: 1.16 MG/DL (ref 0.5–1.3)
EGFRCR SERPLBLD CKD-EPI 2021: 85 ML/MIN/1.73M*2
EOSINOPHIL # BLD AUTO: 0.15 X10*3/UL (ref 0–0.7)
EOSINOPHIL NFR BLD AUTO: 1.9 %
ERYTHROCYTE [DISTWIDTH] IN BLOOD BY AUTOMATED COUNT: 13.5 % (ref 11.5–14.5)
ETHANOL SERPL-MCNC: <10 MG/DL
FENTANYL+NORFENTANYL UR QL SCN: ABNORMAL
GLUCOSE SERPL-MCNC: 82 MG/DL (ref 74–99)
HCT VFR BLD AUTO: 41 % (ref 41–52)
HGB BLD-MCNC: 14.1 G/DL (ref 13.5–17.5)
IMM GRANULOCYTES # BLD AUTO: 0.04 X10*3/UL (ref 0–0.7)
IMM GRANULOCYTES NFR BLD AUTO: 0.5 % (ref 0–0.9)
LYMPHOCYTES # BLD AUTO: 3.47 X10*3/UL (ref 1.2–4.8)
LYMPHOCYTES NFR BLD AUTO: 43 %
MCH RBC QN AUTO: 31.3 PG (ref 26–34)
MCHC RBC AUTO-ENTMCNC: 34.4 G/DL (ref 32–36)
MCV RBC AUTO: 91 FL (ref 80–100)
METHADONE UR QL SCN: ABNORMAL
MONOCYTES # BLD AUTO: 0.59 X10*3/UL (ref 0.1–1)
MONOCYTES NFR BLD AUTO: 7.3 %
NEUTROPHILS # BLD AUTO: 3.78 X10*3/UL (ref 1.2–7.7)
NEUTROPHILS NFR BLD AUTO: 46.8 %
NRBC BLD-RTO: 0 /100 WBCS (ref 0–0)
OPIATES UR QL SCN: ABNORMAL
OXYCODONE+OXYMORPHONE UR QL SCN: ABNORMAL
PCP UR QL SCN: ABNORMAL
PLATELET # BLD AUTO: 300 X10*3/UL (ref 150–450)
POTASSIUM SERPL-SCNC: 4 MMOL/L (ref 3.5–5.3)
PROT SERPL-MCNC: 5.6 G/DL (ref 6.4–8.2)
RBC # BLD AUTO: 4.51 X10*6/UL (ref 4.5–5.9)
SALICYLATES SERPL-MCNC: <3 MG/DL
SODIUM SERPL-SCNC: 140 MMOL/L (ref 136–145)
WBC # BLD AUTO: 8.1 X10*3/UL (ref 4.4–11.3)

## 2024-09-08 PROCEDURE — 99284 EMERGENCY DEPT VISIT MOD MDM: CPT

## 2024-09-08 PROCEDURE — 80053 COMPREHEN METABOLIC PANEL: CPT

## 2024-09-08 PROCEDURE — 80307 DRUG TEST PRSMV CHEM ANLYZR: CPT

## 2024-09-08 PROCEDURE — 80143 DRUG ASSAY ACETAMINOPHEN: CPT

## 2024-09-08 PROCEDURE — 36415 COLL VENOUS BLD VENIPUNCTURE: CPT

## 2024-09-08 PROCEDURE — 93010 ELECTROCARDIOGRAM REPORT: CPT | Performed by: EMERGENCY MEDICINE

## 2024-09-08 PROCEDURE — 85025 COMPLETE CBC W/AUTO DIFF WBC: CPT

## 2024-09-08 PROCEDURE — 99285 EMERGENCY DEPT VISIT HI MDM: CPT | Performed by: EMERGENCY MEDICINE

## 2024-09-08 SDOH — ECONOMIC STABILITY: HOUSING INSECURITY: FEELS SAFE LIVING IN HOME: NO

## 2024-09-08 SDOH — HEALTH STABILITY: MENTAL HEALTH: IN THE PAST FEW WEEKS, HAVE YOU FELT THAT YOU OR YOUR FAMILY WOULD BE BETTER OFF IF YOU WERE DEAD?: NO

## 2024-09-08 SDOH — HEALTH STABILITY: MENTAL HEALTH: HAVE YOU ACTUALLY HAD ANY THOUGHTS OF KILLING YOURSELF?: YES

## 2024-09-08 SDOH — HEALTH STABILITY: MENTAL HEALTH: SUICIDAL BEHAVIOR (3 MONTHS): NO

## 2024-09-08 SDOH — HEALTH STABILITY: MENTAL HEALTH: HAVE YOU WISHED YOU WERE DEAD OR WISHED YOU COULD GO TO SLEEP AND NOT WAKE UP?: YES

## 2024-09-08 SDOH — HEALTH STABILITY: MENTAL HEALTH: SUICIDAL BEHAVIOR (DESCRIPTION): OVERDOSE 2021

## 2024-09-08 SDOH — HEALTH STABILITY: MENTAL HEALTH: WISH TO BE DEAD (PAST 1 MONTH): YES

## 2024-09-08 SDOH — ECONOMIC STABILITY: GENERAL: FINANCIAL CONCERNS: UNABLE TO MEET BASIC NEEDS

## 2024-09-08 SDOH — HEALTH STABILITY: MENTAL HEALTH: DEPRESSION SYMPTOMS: FEELINGS OF HELPLESSNESS

## 2024-09-08 SDOH — HEALTH STABILITY: MENTAL HEALTH: HAVE YOU EVER DONE ANYTHING, STARTED TO DO ANYTHING, OR PREPARED TO DO ANYTHING TO END YOUR LIFE?: NO

## 2024-09-08 SDOH — HEALTH STABILITY: MENTAL HEALTH: ARE YOU HAVING THOUGHTS OF KILLING YOURSELF RIGHT NOW?: NO

## 2024-09-08 SDOH — HEALTH STABILITY: MENTAL HEALTH: ANXIETY SYMPTOMS: NO PROBLEMS REPORTED OR OBSERVED.

## 2024-09-08 SDOH — HEALTH STABILITY: MENTAL HEALTH: SUICIDE ASSESSMENT: ADULT (C-SSRS)

## 2024-09-08 SDOH — HEALTH STABILITY: MENTAL HEALTH: ACTIVE SUICIDAL IDEATION WITH SPECIFIC PLAN AND INTENT (PAST 1 MONTH): YES

## 2024-09-08 SDOH — HEALTH STABILITY: MENTAL HEALTH: IN THE PAST WEEK, HAVE YOU BEEN HAVING THOUGHTS ABOUT KILLING YOURSELF?: NO

## 2024-09-08 SDOH — HEALTH STABILITY: MENTAL HEALTH: DELUSIONS: CONTROLLED

## 2024-09-08 SDOH — HEALTH STABILITY: MENTAL HEALTH

## 2024-09-08 SDOH — HEALTH STABILITY: MENTAL HEALTH: HOW DID YOU TRY TO KILL YOURSELF?: OVERDOSE

## 2024-09-08 SDOH — HEALTH STABILITY: MENTAL HEALTH: ACTIVE SUICIDAL IDEATION WITH SOME INTENT TO ACT, WITHOUT SPECIFIC PLAN (PAST 1 MONTH): NO

## 2024-09-08 SDOH — HEALTH STABILITY: MENTAL HEALTH: IN THE PAST FEW WEEKS, HAVE YOU WISHED YOU WERE DEAD?: YES

## 2024-09-08 SDOH — HEALTH STABILITY: MENTAL HEALTH: BEHAVIORS/MOOD: COOPERATIVE;CALM;SAD

## 2024-09-08 SDOH — HEALTH STABILITY: MENTAL HEALTH: NON-SPECIFIC ACTIVE SUICIDAL THOUGHTS (PAST 1 MONTH): YES

## 2024-09-08 SDOH — HEALTH STABILITY: MENTAL HEALTH: SUICIDAL BEHAVIOR (LIFETIME): YES

## 2024-09-08 SDOH — HEALTH STABILITY: MENTAL HEALTH: BEHAVIORAL HEALTH(WDL): EXCEPTIONS TO WDL

## 2024-09-08 SDOH — HEALTH STABILITY: MENTAL HEALTH: HAVE YOU EVER TRIED TO KILL YOURSELF?: YES

## 2024-09-08 SDOH — HEALTH STABILITY: MENTAL HEALTH
HAVE YOU STARTED TO WORK OUT OR WORKED OUT THE DETAILS OF HOW TO KILL YOURSELF? DO YOU INTENT TO CARRY OUT THIS PLAN?: YES

## 2024-09-08 SDOH — HEALTH STABILITY: MENTAL HEALTH: SLEEP PATTERN: UNABLE TO ASSESS

## 2024-09-08 SDOH — HEALTH STABILITY: MENTAL HEALTH: WHEN DID YOU TRY TO KILL YOURSELF?: 2021

## 2024-09-08 SDOH — HEALTH STABILITY: MENTAL HEALTH: HAVE YOU HAD THESE THOUGHTS AND HAD SOME INTENTION OF ACTING ON THEM?: NO

## 2024-09-08 SDOH — HEALTH STABILITY: MENTAL HEALTH: HAVE YOU BEEN THINKING ABOUT HOW YOU MIGHT DO THIS?: YES

## 2024-09-08 ASSESSMENT — LIFESTYLE VARIABLES
HAVE PEOPLE ANNOYED YOU BY CRITICIZING YOUR DRINKING: NO
HAVE YOU EVER FELT YOU SHOULD CUT DOWN ON YOUR DRINKING: NO
SUBSTANCE_ABUSE_PAST_12_MONTHS: YES
EVER FELT BAD OR GUILTY ABOUT YOUR DRINKING: NO
TOTAL SCORE: 0
EVER HAD A DRINK FIRST THING IN THE MORNING TO STEADY YOUR NERVES TO GET RID OF A HANGOVER: NO
PRESCIPTION_ABUSE_PAST_12_MONTHS: NO

## 2024-09-08 ASSESSMENT — COLUMBIA-SUICIDE SEVERITY RATING SCALE - C-SSRS
1. IN THE PAST MONTH, HAVE YOU WISHED YOU WERE DEAD OR WISHED YOU COULD GO TO SLEEP AND NOT WAKE UP?: YES
6. HAVE YOU EVER DONE ANYTHING, STARTED TO DO ANYTHING, OR PREPARED TO DO ANYTHING TO END YOUR LIFE?: NO
2. HAVE YOU ACTUALLY HAD ANY THOUGHTS OF KILLING YOURSELF?: NO

## 2024-09-08 ASSESSMENT — PAIN - FUNCTIONAL ASSESSMENT: PAIN_FUNCTIONAL_ASSESSMENT: 0-10

## 2024-09-08 ASSESSMENT — PAIN SCALES - GENERAL: PAINLEVEL_OUTOF10: 0 - NO PAIN

## 2024-09-08 NOTE — ED TRIAGE NOTES
Patient presents to the ED for suicidal ideations x last few days. Patient denies any plan or intentions, states he just feels depressed. Denies HI, AH/VH

## 2024-09-08 NOTE — PROGRESS NOTES
Handoff received: 9/8/2024 0 700 handoff care received from Dr. Choi at this time. Please refer to her note for initial plan of care of this patient.     Patient signed out to me pending EPAT evaluation and recommendations.  I agree with my prior providers assessment and plan of care.  See results review for workup.  I did not need to order any additional laboratory/radiologic studies or medications for the patient during my course of care. .  He remained calm and cooperative during my ED course of care.  EPAT perform the evaluation and recommended discharge home.  Patient is deemed that a danger to himself or others and I am in agreement with this plan.  He states that he does follow-up with his psychiatrist and will follow-up with them in the next week.  He has a good support system at home.  He is educated on safety.  Findings were discussed with patient and patient agreeable for plan to discharge home with primary care provider and psychiatry follow ups in the next week for reevaluation and long-term management.  Educated continue his already prescribed medications at home. Return precautions discussed and patient acknowledged understanding.  All questions and concerns answered prior to discharge.     This patient was staffed with ED Attending Dr. Rodriges to review the plan of care during ED course.     *Please note that portions of this note may have been completed with a voice recognition program.  Efforts were made to edit the dictations but occasionally, words are mis-transcribed.

## 2024-09-08 NOTE — PROGRESS NOTES
EPAT - Social Work Psychiatric Assessment    Arrival Details  Mode of Arrival: Ambulatory  Admission Source: Other (Comment) (community)  Admission Type: Voluntary  EPAT Assessment Start Date: 09/08/24  EPAT Assessment Start Time: 1048  Name of : BELKIS Means MSSA    History of Present Illness  Admission Reason: Patient is 35yo male presenting to ED for suicidal ideations. Review chart history including previous ED encoutners and current triage and provide note. Patient Diagnosed with schizophrenia. he Has been to the ED everyday for the past year. Endorses SI, but no plan/intent. Has Chronic homelessness.  Patient reported staying at various shelters ([address] [place] [address], [facility], [facility] [employer]) but would leave d/t cleanliness.   Last Admission 2016 for intentional overdose but reports having another overdose unreported. Does not take Risperdal as prescribed due to patient reporting he lost prescription. He is awaiting group home placement in collaborating with [facility] . Reports needing his medication before being able to go to a group home. He has to wait another two weeks for a refill. Patient coming to ED to avoid decline in mental health due to community stressors.    SW Readmission Information   Readmission within 30 Days: No    Psychiatric Symptoms  Anxiety Symptoms: No problems reported or observed.  Depression Symptoms: Feelings of helplessness  Tiana Symptoms: No problems reported or observed.    Psychosis Symptoms  Hallucination Type: No problems reported or observed.  Delusion Type: No problems reported or observed.    Additional Symptoms - Adult  Generalized Anxiety Disorder: No problems reported or observed.  Obsessive Compulsive Disorder: No problems reported or observed.  Panic Attack: No problems reported or observed.  Post Traumatic Stress Disorder: Traumatic event (Chronic Homelessness)  Delirium: No problems reported or  observed.    Past Psychiatric History/Meds/Treatments  Past Psychiatric History: Schizophrenia  Past Psychiatric Meds/Treatments: risperdal  Past Violence/Victimization History: none reported    Current Mental Health Contacts   Name/Phone Number: [facility]    Support System: Community    Living Arrangement: Homeless    Home Safety  Feels Safe Living in Home: No  Home Safety : Prefers not to go to shelters    Income Information  Employment Status for: Patient  Employment Status: Unemployed  Income Source: Unemployed  Income/Expense Information: Expenses exceed income  Financial Concerns: Unable to Meet Basic Needs  Employment/ Finance Comments: Patient still looking for employment    F?rsat [employer] F?rsat Service/Education History  Current or Previous  Service: None  Education Level: High school  History of Learning Problems: No  History of School Behavior Problems: No  School History: graduated HS, per chart review    Social/Cultural History  Social History: Patient is guardian, chronic homelessness  Cultural Requests During Hospitalization: none  Spiritual Requests During Hospitalization: none  Important Activities:  (none idnentified)    Legal  Legal Considerations: Patient/ Family Ability to Make Healthcare Decisions  Criminal Activity/ Legal Involvement Pertinent to Current Situation/ Hospitalization: none identified  Legal Concerns: none identified    Drug Screening  Have you used any substances (canabis, cocaine, heroin, hallucinogens, inhalants, etc.) in the past 12 months?: Yes  Have you used any prescription drugs other than prescribed in the past 12 months?: No  Is a toxicology screen needed?: Yes    Stage of Change  Duration of Substance Use: unidentified  Frequency of Substance Use: weekly/monthly  Age of First Substance Use: unidentified    Psychosocial  Psychosocial (WDL): Within Defined Limits  Behaviors/Mood: Calm, Cooperative    Orientation  Orientation Level: Oriented X4    General  Appearance  Motor Activity: Unremarkable  Speech Pattern: Other (Comment) (unremarkable)  General Attitude: Cooperative, Pleasant  Appearance/Hygiene: Unremarkable    Thought Process  Coherency: Other (Comment) (unremarkable)  Content: Other (Comment) (patient reporting wanting to get housing and reosures for employment and wanting to avoid geting in trouble at shelters and in the community)  Delusions: Other (Comment) (unremarkable)  Perception: Not altered  Hallucination: None  Judgment/Insight: Other (Comment) (fair)  Confusion: None  Cognition: Appropriate judgement, Appropriate safety awareness    Sleep Pattern  Sleep Pattern: Disturbed/interrupted sleep, Difficulty falling asleep, Other (Comment)    Risk Factors  Self Harm/Suicidal Ideation Plan: Ideation present but no plan of intent to act  Previous Self Harm/Suicidal Plans: Patient has two other attempts  Risk Factors: Lower socioeconomic status, Major mental illness, Male    Violence Risk Assessment  Assessment of Violence: None noted  Thoughts of Harm to Others: No    Ability to Assess Risk Screen  Risk Screen - Ability to Assess: Able to be screened  Ask Suicide-Screening Questions  1. In the past few weeks, have you wished you were dead?: Yes  2. In the past few weeks, have you felt that you or your family would be better off if you were dead?: No  3. In the past week, have you been having thoughts about killing yourself?: No  4. Have you ever tried to kill yourself?: Yes  How did you try to kill yourself?: overdose  When did you try to kill yourself?: 2021  5. Are you having thoughts of killing yourself right now?: No  Calculated Risk Score: Potential Risk  [place] Suicide Severity Rating Scale (Screener/Recent Self-Report)  1. Wish to be Dead (Past 1 Month): Yes  2. Non-Specific Active Suicidal Thoughts (Past 1 Month): Yes  3. Active Suicidal Ideation with any Methods (Not Plan) Without Intent to Act (Past 1 Month): No  4. Active Suicidal Ideation  with Some Intent to Act, Without Specific Plan (Past 1 Month): No  5. Active Suicidal Ideation with Specific Plan and Intent (Past 1 Month): Yes  6. Suicidal Behavior (Lifetime): Yes  6. Suicidal Behavior (3 Months): No  6. Suicidal Behavior (Description): Overdose 2021  Calculated C-SSRS Risk Score (Lifetime/Recent): High Risk  Step 1: Risk Factors  Current & Past Psychiatric Dx: Psychotic disorder  Presenting Symptoms: Impulsivity  Precipitants/Stressors: Triggering events leading to humiliation, shame, and/or despair (e.g. loss of relationship, financial or health status) (real or anticipated), Perceived burden on others  Change in Treatment: Non-compliant or not receiving treatment  Access to Lethal Methods : No  Step 2: Protective Factors   Protective Factors Internal: Ability to cope with stress  Protective Factors External: Positive therapeutic relationships  Step 3: Suicidal Ideation Intensity  Most Severe Suicidal Ideation Identified: History of OD  How Many Times Have You Had These Thoughts: Less than once a week  When You Have the Thoughts How Long do They Last : Fleeting - few seconds or minutes  Could/Can You Stop Thinking About Killing Yourself or Wanting to Die if You Want to: Easily able to control thoughts  Are There Things - Anyone or Anything - That Stopped You From Wanting to Die or Acting on: Deterrents probably stopped you  What Sort of Reasons Did You Have For Thinking About Wanting to Die or Killing Yourself: Mostly to get attention, revenge, or a reaction from others  Total Score: 7  Step 5: Documentation  Risk Level: Moderate suicide risk    Psychiatric Impression and Plan of Care  Assessment and Plan: Writer met with this patient. He was cooperative and engaged. Reports he is just looking to stay out of trouble and will remain coming to the ED until he finds a group home. he states he was unable to fill his meds due to losing it and couldnt get a refill. At this time patient reports he is  going to utlize the centers and/or [employer] for job training. Reports he does not need admission at time. Consulted with provider who agrees that patient does not meet criteria for inpatient admission today.  Plan Comments: Discharge    Outcome/Disposition  Patient's Perception of Outcome Achieved: Agreeable to discharge  Assessment, Recommendations and Risk Level Reviewed with: BRY Mooney-CNP  Contact Name: [facility]  Contact Number(s): [phone]  Contact Relationship:   EPAT Assessment Completed Date: 09/08/24  EPAT Assessment Completed Time: 1238  Patient Disposition: Home    Social Work Note

## 2024-09-08 NOTE — Clinical Note
Misael Rhodes was seen and treated in our emergency department on 9/8/2024.  He may return to work on 09/09/2024.       If you have any questions or concerns, please don't hesitate to call.      Lynda Benson, APRN-CNP

## 2024-09-09 NOTE — ED PROVIDER NOTES
"History of Present Illness     History provided by: Patient  Limitations to History: Mental Illness  External Records Reviewed with Brief Summary:  Previous ED visits and EPAT notes demonstrating frequent presentation, usually psychiatric baseline, usually discharged    HPI:  Misael Rhodes is a 34 y.o. male with past medical history of schizophrenia and polysubstance use who presents today for suicidal ideation.  Patient states that he was having thoughts of hurting himself after drinking today.  He also endorses that he has a headache, but does not want any medication for. The headache was gradual in onset, not the worst headache of his life. He denies any trauma, falls, or injuries. No passing out. He reports that he just \"needs some sleep and for you to shut up\" and his headache will go away.  He denies any chest pain or shortness breath.  Denies any vision changes, neck pain, nausea, vomiting, diarrhea, or abdominal pain.  Denies any numbness, weakness, or tingling his arms or legs.  Does endorse that he is supposed be on medications including risperidone, but declines any medications.    Physical Exam   Triage vitals:  T 37 °C (98.6 °F)  HR 70  /58  RR 18  O2 98 % None (Room air)    Physical Exam  Vitals and nursing note reviewed.   Constitutional:       General: He is not in acute distress.     Appearance: Normal appearance. He is not ill-appearing or diaphoretic.   HENT:      Head: Normocephalic and atraumatic.      Mouth/Throat:      Mouth: Mucous membranes are moist.      Pharynx: No oropharyngeal exudate or posterior oropharyngeal erythema.   Eyes:      General: No scleral icterus.     Extraocular Movements: Extraocular movements intact.      Pupils: Pupils are equal, round, and reactive to light.   Cardiovascular:      Rate and Rhythm: Normal rate and regular rhythm.      Pulses: Normal pulses.      Heart sounds: Normal heart sounds. No murmur heard.     No gallop.   Pulmonary:      Effort: " Pulmonary effort is normal. No respiratory distress.      Breath sounds: Normal breath sounds. No stridor. No wheezing, rhonchi or rales.   Abdominal:      General: Bowel sounds are normal. There is no distension.      Palpations: Abdomen is soft. There is no mass.      Tenderness: There is no abdominal tenderness. There is no guarding.      Hernia: No hernia is present.   Musculoskeletal:         General: No swelling, deformity or signs of injury. Normal range of motion.      Cervical back: Normal range of motion and neck supple. No rigidity or tenderness.   Skin:     General: Skin is warm.      Capillary Refill: Capillary refill takes less than 2 seconds.      Findings: No erythema, lesion or rash.   Neurological:      General: No focal deficit present.      Mental Status: He is alert and oriented to person, place, and time. Mental status is at baseline.      Cranial Nerves: No cranial nerve deficit.      Sensory: No sensory deficit.      Motor: No weakness.   Psychiatric:      Comments: Reports suicidal ideation. Denies homicidal ideation. Denies auditory or visual hallucinations.          Medical Decision Making & ED Course   Medical Decision Makin y.o. male with past medical history of schizoaffective disorder as well as polysubstance use who presents today for suicidal ideation. In the Emergency Department, hospital records were reviewed. The patient is afebrile with stable vital signs. The patient is in no respiratory distress, satting well on room air. Patient is neurologically intact. Given the fact the patient reported he was suicidal, we will get EPAT labs as well as have EPAT evaluate him. CBC and CMP were unremarkable. Urine drug screen was positive for marijuana. Acute tox panel was negative. Patient was medically cleared for EPAT evaluation. Patient was signed out to the oncoming team ED team at 7 AM pending EPAT evaluation and pending remainder of ED course and final disposition.   ----  Social  Determinants of Health which Significantly Impact Care: Housing insecurity and Mental health disorder     EKG Independent Interpretation:  EKG from 9/5/2024 demonstrates sinus bradycardia with normal intervals, no evidence of ST elevation ST depression or T wave inversion    Independent Result Review and Interpretation: Relevant laboratory and radiographic results were reviewed and independently interpreted by myself.  As necessary, they are commented on in the ED Course.    Chronic conditions affecting the patient's care: As documented above in [facility]    The patient was discussed with the following consultants/services: None    Care Considerations: As documented above in [facility]    ED Course:  Diagnoses as of 09/09/24 1318   Suicidal ideation     Disposition   Patient was signed out to [facility] at 700 pending completion of their work-up.  Please see the next provider's transition of care note for the remainder of the patient's care.     Procedures   Procedures        Chinedu Choi MD  Emergency Medicine       Chinedu Choi MD  Resident  09/09/24 1331    I saw and evaluated the patient. I personally obtained the key and critical portions of the history and physical exam or was physically present for key and critical portions performed by the resident/fellow. I reviewed the resident/fellow's documentation and discussed the patient with the resident/fellow. I agree with the resident/fellow's medical decision making as documented in the note.    MD Sofie Silver MD  09/09/24 [id number]